# Patient Record
Sex: MALE | Race: WHITE | Employment: OTHER | URBAN - METROPOLITAN AREA
[De-identification: names, ages, dates, MRNs, and addresses within clinical notes are randomized per-mention and may not be internally consistent; named-entity substitution may affect disease eponyms.]

---

## 2018-03-15 ENCOUNTER — OFFICE VISIT (OUTPATIENT)
Dept: CARDIOLOGY CLINIC | Age: 78
End: 2018-03-15
Payer: MEDICARE

## 2018-03-15 VITALS
WEIGHT: 259.7 LBS | SYSTOLIC BLOOD PRESSURE: 118 MMHG | DIASTOLIC BLOOD PRESSURE: 80 MMHG | BODY MASS INDEX: 33.33 KG/M2 | HEIGHT: 74 IN | HEART RATE: 63 BPM | RESPIRATION RATE: 16 BRPM

## 2018-03-15 DIAGNOSIS — R94.39 ABNORMAL STRESS TEST: ICD-10-CM

## 2018-03-15 DIAGNOSIS — R94.31 ABNORMAL ECG: ICD-10-CM

## 2018-03-15 DIAGNOSIS — I10 HYPERTENSION, UNSPECIFIED TYPE: Primary | ICD-10-CM

## 2018-03-15 PROCEDURE — 93000 ELECTROCARDIOGRAM COMPLETE: CPT | Performed by: INTERNAL MEDICINE

## 2018-03-15 PROCEDURE — 99204 OFFICE O/P NEW MOD 45 MIN: CPT | Performed by: INTERNAL MEDICINE

## 2018-03-15 NOTE — PROGRESS NOTES
differently: Take 100 mg by mouth 2 times daily ) 60 capsule 0    Dextromethorphan-Guaifenesin (MUCINEX DM)  MG TB12 Take 1 tablet by mouth 2 times daily (Patient taking differently: Take 1 tablet by mouth 2 times daily ) 20 tablet 0    benzonatate (TESSALON) 200 MG capsule Take 1 capsule by mouth 3 times daily as needed for Cough (Patient taking differently: Take 200 mg by mouth 3 times daily as needed for Cough ) 30 capsule 0    ipratropium (ATROVENT) 0.06 % nasal spray 2 sprays by Nasal route 4 times daily for 4 days 1 Bottle 0     No current facility-administered medications for this visit.          ALLERGIES:  Allergies   Allergen Reactions    Other        MEDICAL HISTORY:  Past Medical History:   Diagnosis Date    BPH (benign prostatic hyperplasia)     Coronary artery disease     Diverticulitis of colon     Hyperlipidemia     Hypertension     Intestinal ulcer     PUD (peptic ulcer disease)     Sleep apnea     Possible       SURGICAL HISTORY:  Past Surgical History:   Procedure Laterality Date    CORONARY ANGIOPLASTY  7/13/09    DIAGNOSTIC CARDIAC CATH LAB PROCEDURE  7/13/09    NOSE SURGERY      SMALL INTESTINE SURGERY         FAMILY HISTORY:  Family History   Problem Relation Age of Onset    Heart Attack Father        SOCIAL HISTORY:  Social History     Social History    Marital status:      Spouse name: N/A    Number of children: N/A    Years of education: N/A     Social History Main Topics    Smoking status: Former Smoker     Packs/day: 1.00     Years: 40.00     Types: Cigarettes     Quit date: 3/15/2003    Smokeless tobacco: Never Used    Alcohol use 1.8 oz/week     3 Cans of beer per week      Comment: occasionally    Drug use: No    Sexual activity: Not Asked     Other Topics Concern    None     Social History Narrative    None       PHYSICAL EXAM:  Vitals:    03/15/18 1433   BP: 118/80   Pulse: 63   Resp: 16   Weight: 259 lb 11.2 oz (117.8 kg)   Height: 6' 2\" (1.88

## 2018-03-16 ENCOUNTER — TELEPHONE (OUTPATIENT)
Dept: CARDIOLOGY CLINIC | Age: 78
End: 2018-03-16

## 2018-03-20 ENCOUNTER — HOSPITAL ENCOUNTER (OUTPATIENT)
Dept: CARDIOLOGY | Age: 78
Discharge: HOME | End: 2018-03-20
Payer: MEDICARE

## 2018-03-20 DIAGNOSIS — R94.39 ABNORMAL STRESS TEST: ICD-10-CM

## 2018-03-20 DIAGNOSIS — R94.31 ABNORMAL ECG: ICD-10-CM

## 2018-03-20 LAB
LV EF: 65 %
LVEF MODALITY: NORMAL

## 2018-03-20 PROCEDURE — 93306 TTE W/DOPPLER COMPLETE: CPT

## 2018-03-21 ENCOUNTER — TELEPHONE (OUTPATIENT)
Dept: CARDIOLOGY CLINIC | Age: 78
End: 2018-03-21

## 2018-09-28 ENCOUNTER — OFFICE VISIT (OUTPATIENT)
Dept: CARDIOLOGY CLINIC | Age: 78
End: 2018-09-28
Payer: MEDICARE

## 2018-09-28 VITALS
BODY MASS INDEX: 32.74 KG/M2 | WEIGHT: 255.1 LBS | SYSTOLIC BLOOD PRESSURE: 142 MMHG | HEIGHT: 74 IN | HEART RATE: 67 BPM | RESPIRATION RATE: 18 BRPM | DIASTOLIC BLOOD PRESSURE: 86 MMHG

## 2018-09-28 DIAGNOSIS — I10 HYPERTENSION, UNSPECIFIED TYPE: Primary | ICD-10-CM

## 2018-09-28 DIAGNOSIS — I25.119 CORONARY ARTERY DISEASE WITH ANGINA PECTORIS, UNSPECIFIED VESSEL OR LESION TYPE, UNSPECIFIED WHETHER NATIVE OR TRANSPLANTED HEART (HCC): ICD-10-CM

## 2018-09-28 PROCEDURE — 99214 OFFICE O/P EST MOD 30 MIN: CPT | Performed by: INTERNAL MEDICINE

## 2018-09-28 PROCEDURE — 93000 ELECTROCARDIOGRAM COMPLETE: CPT | Performed by: INTERNAL MEDICINE

## 2018-09-28 RX ORDER — AMLODIPINE BESYLATE 5 MG/1
7.5 TABLET ORAL DAILY
Qty: 135 TABLET | Refills: 3 | Status: SHIPPED | OUTPATIENT
Start: 2018-09-28 | End: 2019-09-12

## 2018-09-28 RX ORDER — ATORVASTATIN CALCIUM 20 MG/1
TABLET, FILM COATED ORAL
Refills: 2 | COMMUNITY
Start: 2018-09-17

## 2019-09-04 ENCOUNTER — APPOINTMENT (OUTPATIENT)
Dept: CT IMAGING | Age: 79
DRG: 390 | End: 2019-09-04
Payer: MEDICARE

## 2019-09-04 ENCOUNTER — HOSPITAL ENCOUNTER (INPATIENT)
Age: 79
LOS: 2 days | Discharge: HOME | DRG: 390 | End: 2019-09-06
Attending: EMERGENCY MEDICINE | Admitting: FAMILY MEDICINE
Payer: MEDICARE

## 2019-09-04 ENCOUNTER — APPOINTMENT (OUTPATIENT)
Dept: GENERAL RADIOLOGY | Age: 79
DRG: 390 | End: 2019-09-04
Payer: MEDICARE

## 2019-09-04 DIAGNOSIS — K56.609 SMALL BOWEL OBSTRUCTION (HCC): Primary | ICD-10-CM

## 2019-09-04 LAB
ALBUMIN SERPL-MCNC: 4.6 G/DL (ref 3.5–5.2)
ALP BLD-CCNC: 76 U/L (ref 40–129)
ALT SERPL-CCNC: 21 U/L (ref 0–40)
ANION GAP SERPL CALCULATED.3IONS-SCNC: 13 MMOL/L (ref 7–16)
AST SERPL-CCNC: 21 U/L (ref 0–39)
BACTERIA: ABNORMAL /HPF
BASOPHILS ABSOLUTE: 0.02 E9/L (ref 0–0.2)
BASOPHILS RELATIVE PERCENT: 0.2 % (ref 0–2)
BILIRUB SERPL-MCNC: 2.5 MG/DL (ref 0–1.2)
BILIRUBIN URINE: NEGATIVE
BLOOD, URINE: NEGATIVE
BUN BLDV-MCNC: 13 MG/DL (ref 8–23)
CALCIUM SERPL-MCNC: 9.4 MG/DL (ref 8.6–10.2)
CHLORIDE BLD-SCNC: 102 MMOL/L (ref 98–107)
CLARITY: CLEAR
CO2: 25 MMOL/L (ref 22–29)
COLOR: YELLOW
CREAT SERPL-MCNC: 0.9 MG/DL (ref 0.7–1.2)
EOSINOPHILS ABSOLUTE: 0 E9/L (ref 0.05–0.5)
EOSINOPHILS RELATIVE PERCENT: 0 % (ref 0–6)
GFR AFRICAN AMERICAN: >60
GFR NON-AFRICAN AMERICAN: >60 ML/MIN/1.73
GLUCOSE BLD-MCNC: 164 MG/DL (ref 74–99)
GLUCOSE URINE: NEGATIVE MG/DL
HCT VFR BLD CALC: 45.4 % (ref 37–54)
HEMOGLOBIN: 15.7 G/DL (ref 12.5–16.5)
IMMATURE GRANULOCYTES #: 0.03 E9/L
IMMATURE GRANULOCYTES %: 0.3 % (ref 0–5)
KETONES, URINE: 15 MG/DL
LACTIC ACID, SEPSIS: 1.4 MMOL/L (ref 0.5–1.9)
LEUKOCYTE ESTERASE, URINE: NEGATIVE
LIPASE: 32 U/L (ref 13–60)
LYMPHOCYTES ABSOLUTE: 0.68 E9/L (ref 1.5–4)
LYMPHOCYTES RELATIVE PERCENT: 6 % (ref 20–42)
MCH RBC QN AUTO: 30.1 PG (ref 26–35)
MCHC RBC AUTO-ENTMCNC: 34.6 % (ref 32–34.5)
MCV RBC AUTO: 87.1 FL (ref 80–99.9)
MONOCYTES ABSOLUTE: 0.35 E9/L (ref 0.1–0.95)
MONOCYTES RELATIVE PERCENT: 3.1 % (ref 2–12)
NEUTROPHILS ABSOLUTE: 10.31 E9/L (ref 1.8–7.3)
NEUTROPHILS RELATIVE PERCENT: 90.4 % (ref 43–80)
NITRITE, URINE: NEGATIVE
PDW BLD-RTO: 13.7 FL (ref 11.5–15)
PH UA: 5.5 (ref 5–9)
PLATELET # BLD: 202 E9/L (ref 130–450)
PMV BLD AUTO: 8.9 FL (ref 7–12)
POTASSIUM SERPL-SCNC: 4.2 MMOL/L (ref 3.5–5)
PROTEIN UA: 100 MG/DL
RBC # BLD: 5.21 E12/L (ref 3.8–5.8)
RBC UA: ABNORMAL /HPF (ref 0–2)
SODIUM BLD-SCNC: 140 MMOL/L (ref 132–146)
SPECIFIC GRAVITY UA: 1.02 (ref 1–1.03)
TOTAL PROTEIN: 7.5 G/DL (ref 6.4–8.3)
TROPONIN: <0.01 NG/ML (ref 0–0.03)
UROBILINOGEN, URINE: 0.2 E.U./DL
WBC # BLD: 11.4 E9/L (ref 4.5–11.5)
WBC UA: ABNORMAL /HPF (ref 0–5)

## 2019-09-04 PROCEDURE — 80053 COMPREHEN METABOLIC PANEL: CPT

## 2019-09-04 PROCEDURE — 83690 ASSAY OF LIPASE: CPT

## 2019-09-04 PROCEDURE — C9113 INJ PANTOPRAZOLE SODIUM, VIA: HCPCS | Performed by: STUDENT IN AN ORGANIZED HEALTH CARE EDUCATION/TRAINING PROGRAM

## 2019-09-04 PROCEDURE — 6360000002 HC RX W HCPCS: Performed by: FAMILY MEDICINE

## 2019-09-04 PROCEDURE — 85025 COMPLETE CBC W/AUTO DIFF WBC: CPT

## 2019-09-04 PROCEDURE — 99285 EMERGENCY DEPT VISIT HI MDM: CPT

## 2019-09-04 PROCEDURE — 74177 CT ABD & PELVIS W/CONTRAST: CPT

## 2019-09-04 PROCEDURE — 1200000000 HC SEMI PRIVATE

## 2019-09-04 PROCEDURE — 96374 THER/PROPH/DIAG INJ IV PUSH: CPT

## 2019-09-04 PROCEDURE — 81001 URINALYSIS AUTO W/SCOPE: CPT

## 2019-09-04 PROCEDURE — 83605 ASSAY OF LACTIC ACID: CPT

## 2019-09-04 PROCEDURE — 2580000003 HC RX 258: Performed by: FAMILY MEDICINE

## 2019-09-04 PROCEDURE — 93005 ELECTROCARDIOGRAM TRACING: CPT | Performed by: STUDENT IN AN ORGANIZED HEALTH CARE EDUCATION/TRAINING PROGRAM

## 2019-09-04 PROCEDURE — 6360000004 HC RX CONTRAST MEDICATION: Performed by: RADIOLOGY

## 2019-09-04 PROCEDURE — 84484 ASSAY OF TROPONIN QUANT: CPT

## 2019-09-04 PROCEDURE — 2500000003 HC RX 250 WO HCPCS: Performed by: FAMILY MEDICINE

## 2019-09-04 PROCEDURE — 6360000002 HC RX W HCPCS: Performed by: STUDENT IN AN ORGANIZED HEALTH CARE EDUCATION/TRAINING PROGRAM

## 2019-09-04 PROCEDURE — 96375 TX/PRO/DX INJ NEW DRUG ADDON: CPT

## 2019-09-04 PROCEDURE — 74018 RADEX ABDOMEN 1 VIEW: CPT

## 2019-09-04 PROCEDURE — 2580000003 HC RX 258: Performed by: RADIOLOGY

## 2019-09-04 PROCEDURE — 71046 X-RAY EXAM CHEST 2 VIEWS: CPT

## 2019-09-04 PROCEDURE — 2580000003 HC RX 258: Performed by: STUDENT IN AN ORGANIZED HEALTH CARE EDUCATION/TRAINING PROGRAM

## 2019-09-04 RX ORDER — MORPHINE SULFATE 4 MG/ML
4 INJECTION, SOLUTION INTRAMUSCULAR; INTRAVENOUS
Status: DISCONTINUED | OUTPATIENT
Start: 2019-09-04 | End: 2019-09-06 | Stop reason: HOSPADM

## 2019-09-04 RX ORDER — METOPROLOL TARTRATE 5 MG/5ML
2.5 INJECTION INTRAVENOUS EVERY 6 HOURS
Status: DISCONTINUED | OUTPATIENT
Start: 2019-09-04 | End: 2019-09-06

## 2019-09-04 RX ORDER — PANTOPRAZOLE SODIUM 40 MG/10ML
40 INJECTION, POWDER, LYOPHILIZED, FOR SOLUTION INTRAVENOUS ONCE
Status: COMPLETED | OUTPATIENT
Start: 2019-09-04 | End: 2019-09-04

## 2019-09-04 RX ORDER — 0.9 % SODIUM CHLORIDE 0.9 %
1000 INTRAVENOUS SOLUTION INTRAVENOUS ONCE
Status: COMPLETED | OUTPATIENT
Start: 2019-09-04 | End: 2019-09-04

## 2019-09-04 RX ORDER — MORPHINE SULFATE 2 MG/ML
2 INJECTION, SOLUTION INTRAMUSCULAR; INTRAVENOUS
Status: DISCONTINUED | OUTPATIENT
Start: 2019-09-04 | End: 2019-09-06 | Stop reason: HOSPADM

## 2019-09-04 RX ORDER — MORPHINE SULFATE 8 MG/ML
8 INJECTION, SOLUTION INTRAMUSCULAR; INTRAVENOUS ONCE
Status: COMPLETED | OUTPATIENT
Start: 2019-09-04 | End: 2019-09-04

## 2019-09-04 RX ORDER — ONDANSETRON 2 MG/ML
4 INJECTION INTRAMUSCULAR; INTRAVENOUS EVERY 8 HOURS PRN
Status: DISCONTINUED | OUTPATIENT
Start: 2019-09-04 | End: 2019-09-06 | Stop reason: HOSPADM

## 2019-09-04 RX ORDER — SODIUM CHLORIDE 0.9 % (FLUSH) 0.9 %
10 SYRINGE (ML) INJECTION PRN
Status: DISCONTINUED | OUTPATIENT
Start: 2019-09-04 | End: 2019-09-06 | Stop reason: HOSPADM

## 2019-09-04 RX ORDER — SODIUM CHLORIDE 9 MG/ML
INJECTION, SOLUTION INTRAVENOUS CONTINUOUS
Status: DISCONTINUED | OUTPATIENT
Start: 2019-09-04 | End: 2019-09-05

## 2019-09-04 RX ORDER — ONDANSETRON 2 MG/ML
4 INJECTION INTRAMUSCULAR; INTRAVENOUS ONCE
Status: COMPLETED | OUTPATIENT
Start: 2019-09-04 | End: 2019-09-04

## 2019-09-04 RX ORDER — SODIUM CHLORIDE 9 MG/ML
INJECTION, SOLUTION INTRAVENOUS CONTINUOUS
Status: DISCONTINUED | OUTPATIENT
Start: 2019-09-04 | End: 2019-09-06

## 2019-09-04 RX ORDER — SODIUM CHLORIDE 0.9 % (FLUSH) 0.9 %
10 SYRINGE (ML) INJECTION EVERY 12 HOURS SCHEDULED
Status: DISCONTINUED | OUTPATIENT
Start: 2019-09-04 | End: 2019-09-06 | Stop reason: HOSPADM

## 2019-09-04 RX ORDER — ACETAMINOPHEN 325 MG/1
650 TABLET ORAL EVERY 4 HOURS PRN
Status: DISCONTINUED | OUTPATIENT
Start: 2019-09-04 | End: 2019-09-06 | Stop reason: HOSPADM

## 2019-09-04 RX ADMIN — ONDANSETRON 4 MG: 2 INJECTION INTRAMUSCULAR; INTRAVENOUS at 11:13

## 2019-09-04 RX ADMIN — SODIUM CHLORIDE 1000 ML: 9 INJECTION, SOLUTION INTRAVENOUS at 11:13

## 2019-09-04 RX ADMIN — HYDROMORPHONE HYDROCHLORIDE 0.5 MG: 1 INJECTION, SOLUTION INTRAMUSCULAR; INTRAVENOUS; SUBCUTANEOUS at 13:50

## 2019-09-04 RX ADMIN — Medication 10 ML: at 12:12

## 2019-09-04 RX ADMIN — ONDANSETRON 4 MG: 2 INJECTION INTRAMUSCULAR; INTRAVENOUS at 18:48

## 2019-09-04 RX ADMIN — MORPHINE SULFATE 2 MG: 2 INJECTION, SOLUTION INTRAMUSCULAR; INTRAVENOUS at 18:48

## 2019-09-04 RX ADMIN — MORPHINE SULFATE 8 MG: 8 INJECTION INTRAVENOUS at 11:13

## 2019-09-04 RX ADMIN — IOPAMIDOL 110 ML: 755 INJECTION, SOLUTION INTRAVENOUS at 12:12

## 2019-09-04 RX ADMIN — IOHEXOL 50 ML: 240 INJECTION, SOLUTION INTRATHECAL; INTRAVASCULAR; INTRAVENOUS; ORAL at 12:12

## 2019-09-04 RX ADMIN — PANTOPRAZOLE SODIUM 40 MG: 40 INJECTION, POWDER, LYOPHILIZED, FOR SOLUTION INTRAVENOUS at 12:04

## 2019-09-04 RX ADMIN — SODIUM CHLORIDE: 9 INJECTION, SOLUTION INTRAVENOUS at 17:07

## 2019-09-04 RX ADMIN — METOPROLOL TARTRATE 2.5 MG: 5 INJECTION, SOLUTION INTRAVENOUS at 23:04

## 2019-09-04 RX ADMIN — SODIUM CHLORIDE: 9 INJECTION, SOLUTION INTRAVENOUS at 22:56

## 2019-09-04 ASSESSMENT — ENCOUNTER SYMPTOMS
VOMITING: 1
ABDOMINAL DISTENTION: 0
CONSTIPATION: 1
ABDOMINAL PAIN: 1
COLOR CHANGE: 0
BLOOD IN STOOL: 0
TROUBLE SWALLOWING: 0
SHORTNESS OF BREATH: 0
NAUSEA: 1
DIARRHEA: 0
SORE THROAT: 0
VOICE CHANGE: 0
COUGH: 0

## 2019-09-04 ASSESSMENT — PAIN DESCRIPTION - PAIN TYPE: TYPE: ACUTE PAIN

## 2019-09-04 ASSESSMENT — PAIN SCALES - GENERAL
PAINLEVEL_OUTOF10: 10
PAINLEVEL_OUTOF10: 5

## 2019-09-04 ASSESSMENT — PAIN DESCRIPTION - DESCRIPTORS: DESCRIPTORS: ACHING;THROBBING

## 2019-09-04 ASSESSMENT — PAIN DESCRIPTION - FREQUENCY: FREQUENCY: CONTINUOUS

## 2019-09-04 ASSESSMENT — PAIN DESCRIPTION - LOCATION
LOCATION: ABDOMEN
LOCATION: ABDOMEN

## 2019-09-04 NOTE — ED PROVIDER NOTES
labs, cardiac work-up ,and perform CT scan abdomen pelvis. Also treating patient with IV fluids, morphine, and Zofran. Will reassess symptoms shortly. [LS]   1038 Patient going to CT scan at this time.    [LS]   1117 EKG: This EKG is signed and interpreted by me. Rate: 81  Rhythm: Sinus  Interpretation: Sinus rhythm, first-degree AV block, RBBB, bifascicular block, minimal voltage criteria for LVH  Comparison: Stable compared to last EKG on 9-28-18      [LS]   1118 Patient is now received his medications. Labs pending.    [LS]   1141 Lactic Acid, Sepsis: 1.4 [LS]   3128 WBC: 11.4 [LS]   1141 Neutrophils %(!): 90.4 [LS]   1149 Troponin: <0.01 [LS]   1150 Lipase: 32 [LS]   1200 Patient reassessed. His abdominal pain is down to a 1/10 in severity and he reports he is comfortable. He has no nausea at this time. His abdomen is soft and minimally tender at this time. 40 mg of IV Protonix has been ordered. Patient just finished his oral contrast and will be going to CT scan soon. Labs essentially unremarkable with the exception of a bilirubin of 2.5. Patient has no jaundice or icterus. Urinalysis is also pending at this time.    [LS]   1328 Patient has a high-grade small bowel obstruction on CT scan. Patient updated on the plan for admission. We have a call out to general surgery. Patient is agreeable to staying. He is still having significant pain so we will give him some Dilaudid. NG tube is also been placed and he is now n.p.o.    [LS]   1358 Case discussed with Dr. Carolyne Adams. He would like the patient to be evaluated by the surgical resident.    [LS]   [phone] Case discussed with Dr. Neelima Garcia. He agrees to accept the patient for admission.    [LS]      ED Course User Index  [LS] Karla Granados,         ED Course as of Sep 04 1619   Wed Sep 04, 2019   1024 Case discussed with the attending physician. We will check labs, cardiac work-up ,and perform CT scan abdomen pelvis.   Also treating patient with IV fluids, morphine, and Zofran. Will reassess symptoms shortly. [LS]   1038 Patient going to CT scan at this time.    [LS]   1117 EKG: This EKG is signed and interpreted by me. Rate: 81  Rhythm: Sinus  Interpretation: Sinus rhythm, first-degree AV block, RBBB, bifascicular block, minimal voltage criteria for LVH  Comparison: Stable compared to last EKG on 9-28-18      [LS]   1118 Patient is now received his medications. Labs pending.    [LS]   1141 Lactic Acid, Sepsis: 1.4 [LS]   8681 WBC: 11.4 [LS]   1141 Neutrophils %(!): 90.4 [LS]   1149 Troponin: <0.01 [LS]   1150 Lipase: 32 [LS]   1200 Patient reassessed. His abdominal pain is down to a 1/10 in severity and he reports he is comfortable. He has no nausea at this time. His abdomen is soft and minimally tender at this time. 40 mg of IV Protonix has been ordered. Patient just finished his oral contrast and will be going to CT scan soon. Labs essentially unremarkable with the exception of a bilirubin of 2.5. Patient has no jaundice or icterus. Urinalysis is also pending at this time.    [LS]   1328 Patient has a high-grade small bowel obstruction on CT scan. Patient updated on the plan for admission. We have a call out to general surgery. Patient is agreeable to staying. He is still having significant pain so we will give him some Dilaudid. NG tube is also been placed and he is now n.p.o.    [LS]   1358 Case discussed with Dr. Verner Laine. He would like the patient to be evaluated by the surgical resident.    [LS]   80 Case discussed with Dr. Héctor Velasquez.   He agrees to accept the patient for admission.    [LS]      ED Course User Index  [LS] Yamila Rosario, DO       --------------------------------------------- PAST HISTORY ---------------------------------------------  Past Medical History:  has a past medical history of BPH (benign prostatic hyperplasia), Coronary artery disease, Diverticulitis of colon, Hyperlipidemia, Hypertension, Intestinal ulcer, PUD (peptic ulcer disease), and Sleep apnea. Past Surgical History:  has a past surgical history that includes Diagnostic Cardiac Cath Lab Procedure (7/13/09); Coronary angioplasty (7/13/09); Nose surgery; and Small intestine surgery. Social History:  reports that he quit smoking about 16 years ago. His smoking use included cigarettes. He has a 40.00 pack-year smoking history. He has never used smokeless tobacco. He reports that he drinks alcohol. He reports that he does not use drugs. Family History: family history includes Heart Attack in his father. The patients home medications have been reviewed. Allergies:  Other    -------------------------------------------------- RESULTS -------------------------------------------------    LABS:  Results for orders placed or performed during the hospital encounter of 09/04/19   CBC Auto Differential   Result Value Ref Range    WBC 11.4 4.5 - 11.5 E9/L    RBC 5.21 3.80 - 5.80 E12/L    Hemoglobin 15.7 12.5 - 16.5 g/dL    Hematocrit 45.4 37.0 - 54.0 %    MCV 87.1 80.0 - 99.9 fL    MCH 30.1 26.0 - 35.0 pg    MCHC 34.6 (H) 32.0 - 34.5 %    RDW 13.7 11.5 - 15.0 fL    Platelets 505 825 - 872 E9/L    MPV 8.9 7.0 - 12.0 fL    Neutrophils % 90.4 (H) 43.0 - 80.0 %    Immature Granulocytes % 0.3 0.0 - 5.0 %    Lymphocytes % 6.0 (L) 20.0 - 42.0 %    Monocytes % 3.1 2.0 - 12.0 %    Eosinophils % 0.0 0.0 - 6.0 %    Basophils % 0.2 0.0 - 2.0 %    Neutrophils Absolute 10.31 (H) 1.80 - 7.30 E9/L    Immature Granulocytes # 0.03 E9/L    Lymphocytes Absolute 0.68 (L) 1.50 - 4.00 E9/L    Monocytes Absolute 0.35 0.10 - 0.95 E9/L    Eosinophils Absolute 0.00 (L) 0.05 - 0.50 E9/L    Basophils Absolute 0.02 0.00 - 0.20 E9/L   Comprehensive Metabolic Panel   Result Value Ref Range    Sodium 140 132 - 146 mmol/L    Potassium 4.2 3.5 - 5.0 mmol/L    Chloride 102 98 - 107 mmol/L    CO2 25 22 - 29 mmol/L    Anion Gap 13 7 - 16 mmol/L    Glucose 164 (H) 74 - 99 mg/dL    BUN 13 8 -

## 2019-09-05 ENCOUNTER — APPOINTMENT (OUTPATIENT)
Dept: GENERAL RADIOLOGY | Age: 79
DRG: 390 | End: 2019-09-05
Payer: MEDICARE

## 2019-09-05 LAB
ANION GAP SERPL CALCULATED.3IONS-SCNC: 10 MMOL/L (ref 7–16)
BUN BLDV-MCNC: 11 MG/DL (ref 8–23)
CALCIUM SERPL-MCNC: 8.5 MG/DL (ref 8.6–10.2)
CHLORIDE BLD-SCNC: 107 MMOL/L (ref 98–107)
CO2: 25 MMOL/L (ref 22–29)
CREAT SERPL-MCNC: 1 MG/DL (ref 0.7–1.2)
EKG ATRIAL RATE: 81 BPM
EKG P AXIS: 5 DEGREES
EKG P-R INTERVAL: 232 MS
EKG Q-T INTERVAL: 458 MS
EKG QRS DURATION: 154 MS
EKG QTC CALCULATION (BAZETT): 532 MS
EKG R AXIS: -78 DEGREES
EKG T AXIS: 11 DEGREES
EKG VENTRICULAR RATE: 81 BPM
GFR AFRICAN AMERICAN: >60
GFR NON-AFRICAN AMERICAN: >60 ML/MIN/1.73
GLUCOSE BLD-MCNC: 130 MG/DL (ref 74–99)
HCT VFR BLD CALC: 40.6 % (ref 37–54)
HEMOGLOBIN: 14 G/DL (ref 12.5–16.5)
MCH RBC QN AUTO: 30.8 PG (ref 26–35)
MCHC RBC AUTO-ENTMCNC: 34.5 % (ref 32–34.5)
MCV RBC AUTO: 89.4 FL (ref 80–99.9)
PDW BLD-RTO: 14.1 FL (ref 11.5–15)
PLATELET # BLD: 180 E9/L (ref 130–450)
PMV BLD AUTO: 8.7 FL (ref 7–12)
POTASSIUM SERPL-SCNC: 3.9 MMOL/L (ref 3.5–5)
RBC # BLD: 4.54 E12/L (ref 3.8–5.8)
SODIUM BLD-SCNC: 142 MMOL/L (ref 132–146)
WBC # BLD: 8.2 E9/L (ref 4.5–11.5)

## 2019-09-05 PROCEDURE — 1200000000 HC SEMI PRIVATE

## 2019-09-05 PROCEDURE — 85027 COMPLETE CBC AUTOMATED: CPT

## 2019-09-05 PROCEDURE — 74018 RADEX ABDOMEN 1 VIEW: CPT

## 2019-09-05 PROCEDURE — 6360000002 HC RX W HCPCS: Performed by: FAMILY MEDICINE

## 2019-09-05 PROCEDURE — 80048 BASIC METABOLIC PNL TOTAL CA: CPT

## 2019-09-05 PROCEDURE — 2500000003 HC RX 250 WO HCPCS: Performed by: FAMILY MEDICINE

## 2019-09-05 PROCEDURE — 93010 ELECTROCARDIOGRAM REPORT: CPT | Performed by: INTERNAL MEDICINE

## 2019-09-05 PROCEDURE — 36415 COLL VENOUS BLD VENIPUNCTURE: CPT

## 2019-09-05 RX ADMIN — METOPROLOL TARTRATE 2.5 MG: 5 INJECTION, SOLUTION INTRAVENOUS at 11:32

## 2019-09-05 RX ADMIN — METOPROLOL TARTRATE 2.5 MG: 5 INJECTION, SOLUTION INTRAVENOUS at 05:06

## 2019-09-05 RX ADMIN — MORPHINE SULFATE 2 MG: 2 INJECTION, SOLUTION INTRAMUSCULAR; INTRAVENOUS at 20:17

## 2019-09-05 RX ADMIN — METOPROLOL TARTRATE 2.5 MG: 5 INJECTION, SOLUTION INTRAVENOUS at 18:45

## 2019-09-05 RX ADMIN — MORPHINE SULFATE 2 MG: 2 INJECTION, SOLUTION INTRAMUSCULAR; INTRAVENOUS at 11:32

## 2019-09-05 RX ADMIN — MORPHINE SULFATE 2 MG: 2 INJECTION, SOLUTION INTRAMUSCULAR; INTRAVENOUS at 00:07

## 2019-09-05 ASSESSMENT — PAIN DESCRIPTION - ORIENTATION
ORIENTATION: MID;LOWER

## 2019-09-05 ASSESSMENT — PAIN DESCRIPTION - PAIN TYPE
TYPE: ACUTE PAIN

## 2019-09-05 ASSESSMENT — PAIN DESCRIPTION - LOCATION
LOCATION: ABDOMEN

## 2019-09-05 ASSESSMENT — PAIN DESCRIPTION - DESCRIPTORS
DESCRIPTORS: ACHING;DISCOMFORT

## 2019-09-05 ASSESSMENT — PAIN DESCRIPTION - PROGRESSION
CLINICAL_PROGRESSION: GRADUALLY WORSENING
CLINICAL_PROGRESSION: NOT CHANGED
CLINICAL_PROGRESSION: GRADUALLY WORSENING

## 2019-09-05 ASSESSMENT — PAIN DESCRIPTION - ONSET
ONSET: ON-GOING

## 2019-09-05 ASSESSMENT — PAIN DESCRIPTION - FREQUENCY
FREQUENCY: CONTINUOUS

## 2019-09-05 ASSESSMENT — PAIN SCALES - GENERAL
PAINLEVEL_OUTOF10: 4
PAINLEVEL_OUTOF10: 5
PAINLEVEL_OUTOF10: 0
PAINLEVEL_OUTOF10: 5
PAINLEVEL_OUTOF10: 2
PAINLEVEL_OUTOF10: 0

## 2019-09-05 ASSESSMENT — PAIN - FUNCTIONAL ASSESSMENT
PAIN_FUNCTIONAL_ASSESSMENT: PREVENTS OR INTERFERES SOME ACTIVE ACTIVITIES AND ADLS

## 2019-09-05 NOTE — H&P
Clear with equal breath sounds. CARDIOVASCULAR:  Regular. No murmur, rub, or gallop. CAROTIDS:  No bruits. EXTREMITIES:  Pulses intact. No edema. AXILLAE:  No adenopathy. ABDOMEN:  Severely distended, tympanitic. There is some tenderness in  the left lower quadrant. No masses. No hepatosplenomegaly. No  rebound. Bowel sounds are present. LABORATORY STUDIES:  Admitting laboratory data is as follows: White  count 11,400, hemoglobin 15.7, and platelet count 391,685. Liver  enzymes were normal except bilirubin slightly high at 3.5. BUN 13,  creatinine 0.9, and potassium 4.2. Lactic acid level normal at 1.4. Urinalysis 2 to 5 white cells and no red cells. DIAGNOSTIC STUDIES:  A CT scan of the abdomen and pelvis was performed,  which revealed a high-grade small bowel obstruction with dilated  fluid-filled proximal small bowel loops and collapsed distal small bowel  loops consistent with a proximal small bowel obstruction. He also  underwent chest x-ray, which was unremarkable. ASSESSMENT:  Small bowel obstruction, ASCVD, hypertension, sleep apnea,  BPH, and hyperlipidemia. PLAN:  Surgical consultation. NG tube with intermittent suction. IV  fluids. Monitor I and O.         Rickie Morataya MD    D: 09/04/2019 18:33:41       T: 09/04/2019 18:40:14     [email or web address]  Job#: [id number]     Doc#: [id number]    CC:

## 2019-09-06 VITALS
HEART RATE: 74 BPM | HEIGHT: 74 IN | DIASTOLIC BLOOD PRESSURE: 73 MMHG | OXYGEN SATURATION: 94 % | SYSTOLIC BLOOD PRESSURE: 147 MMHG | WEIGHT: 258 LBS | RESPIRATION RATE: 20 BRPM | TEMPERATURE: 98.5 F | BODY MASS INDEX: 33.11 KG/M2

## 2019-09-06 LAB
ANION GAP SERPL CALCULATED.3IONS-SCNC: 12 MMOL/L (ref 7–16)
BUN BLDV-MCNC: 8 MG/DL (ref 8–23)
CALCIUM SERPL-MCNC: 8.2 MG/DL (ref 8.6–10.2)
CHLORIDE BLD-SCNC: 102 MMOL/L (ref 98–107)
CO2: 22 MMOL/L (ref 22–29)
CREAT SERPL-MCNC: 0.9 MG/DL (ref 0.7–1.2)
GFR AFRICAN AMERICAN: >60
GFR NON-AFRICAN AMERICAN: >60 ML/MIN/1.73
GLUCOSE BLD-MCNC: 119 MG/DL (ref 74–99)
HCT VFR BLD CALC: 39 % (ref 37–54)
HEMOGLOBIN: 13.6 G/DL (ref 12.5–16.5)
MCH RBC QN AUTO: 31.1 PG (ref 26–35)
MCHC RBC AUTO-ENTMCNC: 34.9 % (ref 32–34.5)
MCV RBC AUTO: 89 FL (ref 80–99.9)
PDW BLD-RTO: 14 FL (ref 11.5–15)
PLATELET # BLD: 153 E9/L (ref 130–450)
PMV BLD AUTO: 8.5 FL (ref 7–12)
POTASSIUM SERPL-SCNC: 3.5 MMOL/L (ref 3.5–5)
RBC # BLD: 4.38 E12/L (ref 3.8–5.8)
SODIUM BLD-SCNC: 136 MMOL/L (ref 132–146)
WBC # BLD: 7.8 E9/L (ref 4.5–11.5)

## 2019-09-06 PROCEDURE — 36415 COLL VENOUS BLD VENIPUNCTURE: CPT

## 2019-09-06 PROCEDURE — 2580000003 HC RX 258: Performed by: FAMILY MEDICINE

## 2019-09-06 PROCEDURE — 2500000003 HC RX 250 WO HCPCS: Performed by: FAMILY MEDICINE

## 2019-09-06 PROCEDURE — 80048 BASIC METABOLIC PNL TOTAL CA: CPT

## 2019-09-06 PROCEDURE — 6370000000 HC RX 637 (ALT 250 FOR IP): Performed by: FAMILY MEDICINE

## 2019-09-06 PROCEDURE — 85027 COMPLETE CBC AUTOMATED: CPT

## 2019-09-06 RX ORDER — AMLODIPINE BESYLATE 5 MG/1
5 TABLET ORAL DAILY
Status: DISCONTINUED | OUTPATIENT
Start: 2019-09-06 | End: 2019-09-06 | Stop reason: HOSPADM

## 2019-09-06 RX ORDER — ATORVASTATIN CALCIUM 20 MG/1
20 TABLET, FILM COATED ORAL NIGHTLY
Status: DISCONTINUED | OUTPATIENT
Start: 2019-09-06 | End: 2019-09-06 | Stop reason: HOSPADM

## 2019-09-06 RX ADMIN — METOPROLOL TARTRATE 25 MG: 25 TABLET ORAL at 10:31

## 2019-09-06 RX ADMIN — AMLODIPINE BESYLATE 5 MG: 5 TABLET ORAL at 10:31

## 2019-09-06 RX ADMIN — Medication 10 ML: at 10:32

## 2019-09-06 RX ADMIN — METOPROLOL TARTRATE 2.5 MG: 5 INJECTION, SOLUTION INTRAVENOUS at 02:28

## 2019-09-06 ASSESSMENT — PAIN DESCRIPTION - DESCRIPTORS: DESCRIPTORS: SORE

## 2019-09-06 ASSESSMENT — PAIN DESCRIPTION - LOCATION: LOCATION: ABDOMEN

## 2019-09-06 ASSESSMENT — PAIN SCALES - GENERAL
PAINLEVEL_OUTOF10: 4
PAINLEVEL_OUTOF10: 0

## 2019-09-06 ASSESSMENT — PAIN DESCRIPTION - ORIENTATION: ORIENTATION: MID;LOWER

## 2019-09-06 ASSESSMENT — PAIN DESCRIPTION - PAIN TYPE: TYPE: ACUTE PAIN

## 2019-09-06 NOTE — PROGRESS NOTES
Called Dr. Maximino Pierce per RN DJ  Reason wife here patient would like to be discharged.   Zora Landrum  9/6/2019  12:00 PM

## 2019-09-06 NOTE — PROGRESS NOTES
[facility] Quality Flow/Interdisciplinary Rounds Progress Note        Quality Flow Rounds held on September 6, 2019    Disciplines Attending:  Bedside Nurse, ,  and Nursing Unit Leadership    Joy Herrera was admitted on 9/4/2019  9:45 AM    Anticipated Discharge Date:  Expected Discharge Date: 09/07/19    Disposition:    Sorin Score:  Sorin Scale Score: 23    Readmission Risk              Risk of Unplanned Readmission:        9           Discussed patient goal for the day, patient clinical progression, and barriers to discharge.   The following Goal(s) of the Day/Commitment(s) have been identified:  Discharge [address] [place]  September 6, 2019

## 2019-09-06 NOTE — PROGRESS NOTES
Subjective: The patient is awake and alert. No problems overnight. Denies chest pain, angina, and dyspnea. Denies abdominal pain. Tolerating clear liquid diet  . No nausea or vomiting.had another small BM,passing flatus    Objective:    Vitals:  BP (!) 174/79   Pulse 89   Temp 98.1 °F (36.7 °C)   Resp 16   Ht 6' 2\" (1.88 m)   Wt 258 lb (117 kg)   SpO2 98%   BMI 33.13 kg/m²     Physical Exam:  Heart:  RRR, no murmurs, gallops, or rubs. Lungs:  CTA bilaterally, no wheeze, rales or rhonchi  Abd: bowel sounds present, nontender,  Extrem:  No clubbing, cyanosis, or edema    Labs:  CBC:   Lab Results   Component Value Date    WBC 8.2 2019    RBC 4.54 2019    HGB 14.0 2019    HCT 40.6 2019    MCV 89.4 2019    MCH 30.8 2019    MCHC 34.5 2019    RDW 14.1 2019     2019    MPV 8.7 2019     BMP:    Lab Results   Component Value Date     2019    K 3.9 2019     2019    CO2 25 2019    BUN 11 2019    LABALBU 4.6 2019    LABALBU 4.2 2011    CREATININE 1.0 2019    CALCIUM 8.5 2019    GFRAA >60 2019    LABGLOM >60 2019    GLUCOSE 130 2019    GLUCOSE 147 2011        Radiology:  Xr Chest Standard (2 Vw)    Result Date: 2019  Patient MRN:  [id number] : 1940 Age: 66 years Gender: Male Order Date:  2019 10:30 AM EXAM: XR CHEST (2 VW) INDICATION:  chest pain chest pain COMPARISON: 10/28/2017 FINDINGS:  Heart, mediastinum and pulmonary vascularity are normal. There are no infiltrates or effusions.  There is minimal scarring in the left costophrenic angle region     No acute process     Xr Abdomen (kub) (single Ap View)    Result Date: 2019  Patient MRN:  [id number] : 1940 Age: 66 years Gender: Male Order Date:  2019 6:15 AM EXAM: XR ABDOMEN (KUB) (SINGLE AP VIEW) NUMBER OF IMAGES:  3 views INDICATION:  SBO SBO COMPARISON: 2019 FINDINGS: This study is centered on the diaphragm. The study is performed for evaluation of the position of the NG tube. There is incomplete evaluation of the chest. There is incomplete evaluation of the abdomen. The visualized portions of the abdomen reveal the bowel gas pattern is nonspecific. An NG tube is noted. The tip of the tube is at the expected level of the gastric cardia. Ct Abdomen Pelvis W Iv Contrast Additional Contrast? Oral    Result Date: 2019  Patient MRN:  [id number] : 1940 Age: 66 years Gender: Male Order Date:  2019 10:30 AM EXAM: CT ABDOMEN PELVIS W IV CONTRAST number of images 447. Contrast. Isovue-370, 110 mL intravenously. Omnipaque 240, 50 mL oral. Technique: Low-dose CT  acquisition technique included one of following options; 1 . Automated exposure control, 2. Adjustment of MA and or KV according to patient's size or 3. Use of iterative reconstruction. INDICATION:  ABDOMINAL PAIN  COMPARISON: 2017 FINDINGS: The lung bases demonstrate minimal scarring and atelectasis. There is coronary artery calcification. There is hepatomegaly with liver measuring 19 cm which is fatty infiltrated. Multiple gallstones are identified in the gallbladder without acute inflammation. Spleen, pancreas, and adrenals are within normal limits. Multiple renal cyst are identified in the kidneys with the largest one in the right kidney measuring 5.5 cm in the largest one in the left kidney measuring 6.5 cm. 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 There is degenerative changes in lumbar spine with bilateral pars defect at L5 with  mild grade 1 spondylolisthesis at L5-S1. There is moderately dilated fluid-filled proximal small bowel loops measuring up to 4 cm with a collapsed distal small bowel loops and colon. Pelvis. Bladder is partially distended. There is diverticulosis of colon without diverticulitis.  The appendix is normal.     High-grade

## 2019-09-12 RX ORDER — AMLODIPINE BESYLATE 5 MG/1
TABLET ORAL
Qty: 135 TABLET | Refills: 3 | Status: SHIPPED
Start: 2019-09-12 | End: 2021-06-15

## 2019-09-30 ENCOUNTER — OFFICE VISIT (OUTPATIENT)
Dept: CARDIOLOGY CLINIC | Age: 79
End: 2019-09-30
Payer: MEDICARE

## 2019-09-30 VITALS
HEIGHT: 74 IN | RESPIRATION RATE: 16 BRPM | HEART RATE: 77 BPM | SYSTOLIC BLOOD PRESSURE: 124 MMHG | WEIGHT: 256.3 LBS | DIASTOLIC BLOOD PRESSURE: 64 MMHG | BODY MASS INDEX: 32.89 KG/M2

## 2019-09-30 DIAGNOSIS — I25.119 CORONARY ARTERY DISEASE WITH ANGINA PECTORIS, UNSPECIFIED VESSEL OR LESION TYPE, UNSPECIFIED WHETHER NATIVE OR TRANSPLANTED HEART (HCC): Primary | ICD-10-CM

## 2019-09-30 PROCEDURE — 99213 OFFICE O/P EST LOW 20 MIN: CPT | Performed by: INTERNAL MEDICINE

## 2019-09-30 PROCEDURE — 93000 ELECTROCARDIOGRAM COMPLETE: CPT | Performed by: INTERNAL MEDICINE

## 2019-09-30 RX ORDER — AMLODIPINE BESYLATE 10 MG/1
TABLET ORAL
Refills: 3 | Status: ON HOLD | COMMUNITY
Start: 2019-08-31 | End: 2021-10-27 | Stop reason: HOSPADM

## 2020-10-05 ENCOUNTER — OFFICE VISIT (OUTPATIENT)
Dept: CARDIOLOGY CLINIC | Age: 80
End: 2020-10-05
Payer: MEDICARE

## 2020-10-05 VITALS
RESPIRATION RATE: 16 BRPM | DIASTOLIC BLOOD PRESSURE: 60 MMHG | WEIGHT: 260.1 LBS | HEIGHT: 74 IN | SYSTOLIC BLOOD PRESSURE: 118 MMHG | HEART RATE: 63 BPM | BODY MASS INDEX: 33.38 KG/M2

## 2020-10-05 PROCEDURE — 93000 ELECTROCARDIOGRAM COMPLETE: CPT | Performed by: INTERNAL MEDICINE

## 2020-10-05 PROCEDURE — 99214 OFFICE O/P EST MOD 30 MIN: CPT | Performed by: INTERNAL MEDICINE

## 2020-10-05 RX ORDER — AZILSARTAN KAMEDOXOMIL AND CHLORTHALIDONE 40; 12.5 MG/1; MG/1
TABLET ORAL DAILY
COMMUNITY
End: 2022-08-31

## 2020-10-05 NOTE — PROGRESS NOTES
Sana Etienne  1940  Date of Service: 10/5/2020    Patient Active Problem List    Diagnosis Date Noted    Small bowel obstruction ([facility] 2019    Bowel obstruction ([facility] 2019    Cholelithiasis 2016    Obstruction of intestine 2016    Generalized abdominal pain 2016    Coronary artery disease      Overview Note:     A. Cardiac catheterization (09): Nondominant right coronary artery but with a large posterolateral branch supplying a portion of the inferior wall with 70% stenosis in the mid right coronary artery. B. Stenting of mid RCA with 3.5 x 12 mm  non drug-eluting stent.        Sleep apnea     BPH (benign prostatic hyperplasia)     Hyperlipidemia     Hypertension        Social History     Socioeconomic History    Marital status:      Spouse name: None    Number of children: None    Years of education: None    Highest education level: None   Occupational History    None   Social Needs    Financial resource strain: None    Food insecurity     Worry: None     Inability: None    Transportation needs     Medical: None     Non-medical: None   Tobacco Use    Smoking status: Former Smoker     Packs/day: 1.00     Years: 40.00     Pack years: 40.00     Types: Cigarettes     Last attempt to quit: 3/15/2003     Years since quittin.5    Smokeless tobacco: Never Used   Substance and Sexual Activity    Alcohol use: Yes     Comment: occasionally    Drug use: No    Sexual activity: None   Lifestyle    Physical activity     Days per week: None     Minutes per session: None    Stress: None   Relationships    Social connections     Talks on phone: None     Gets together: None     Attends [employer] service: None     Active member of club or organization: None     Attends meetings of clubs or organizations: None     Relationship status: None    Intimate partner violence     Fear of current or ex partner: None     Emotionally abused: None Physically abused: None     Forced sexual activity: None   Other Topics Concern    None   Social History Narrative    None       Current Outpatient Medications   Medication Sig Dispense Refill    Azilsartan-Chlorthalidone (EDARBYCLOR) 40-12.5 MG TABS Take by mouth daily      amLODIPine (NORVASC) 10 MG tablet TAKE ONE TABLET BY MOUTH DAILY  3    atorvastatin (LIPITOR) 20 MG tablet TAKE 1 TABLET BY MOUTH EVERY DAY  2    Cyanocobalamin (VITAMIN B-12 IJ) Inject as directed every 30 days      vitamin D (ERGOCALCIFEROL) 08656 UNITS CAPS capsule Take 50,000 Units by mouth twice a week       aspirin (ASPIRIN CHILDRENS) 81 MG chewable tablet Take 1 tablet by mouth daily. 30 tablet 3    metoprolol (TOPROL XL) 25 MG XL tablet Take 25 mg by mouth 2 times daily       amLODIPine (NORVASC) 5 MG tablet TAKE 1 AND 1/2 TABLET BY MOUTH ONCE DAILY AS DIRECTED (Patient not taking: No sig reported) 135 tablet 3    Probiotic Product (PROBIOTIC-10 PO) Take 1 tablet by mouth daily       No current facility-administered medications for this visit. No Known Allergies    Chief Complaint:  Romina Dominguez is here today for follow up and management/recomendations for CAD     History of Present Illness: Romina Dominguez states that He does house work, goes up the stairs, does yard work & goes shopping & takes care of his 5 acre property. He also walks up to a mile on the bike trail. He denies any chest discomfort, dyspnea on exertion, orthopnea/PND, or lower extremity edema. He denies any palpitations or presyncopal symptoms. REVIEW OF SYSTEMS:  As above. Patient does not complain of any fever, chills, nausea, vomiting or diarrhea. No focal, motor or neurological deficits. No changes in his/her vision, hearing, bowel or bladder habits. He is not known to have a history of thyroid problems. No recent nose bleeds.     PHYSICAL EXAM:  Vitals:    10/05/20 0951   BP: 118/60   Pulse: 63   Resp: 16   Weight: 260 lb 1.6 oz (118 kg)   Height: 6' 2\" (1.88 m)       GENERAL:  He is alert and oriented x 3, communicates well, in no distress. NECK:  No masses, trachea is mid position. Supple, full ROM, no JVD or bruits. No palpable thyromegaly or lymphadenopathy. HEART: Mildly distant. Regular rate and rhythm. Normal S1 and S2. There are no abnormal murmurs or gallops. LUNGS:  Clear to auscultation bilaterally. No use of accessory muscles. symmetrical excursion. Mildly decreased air movement. ABDOMEN:  Soft, non-tender. Normal bowel sounds. Morbidly obese. EXTREMITIES:  Full ROM x 4. No bilateral lower extremity edema. Good distal pulses. EYES:  Extraocular muscles intact. PERRL. Normal lids & conjunctiva. ENT:  Nares are clear & not bleeding. Moist mucosa. Normal lips formation. No external masses   NEURO: no tremors, full ROM x 4, EOMI. SKIN:  Warm, dry and intact. Normal turgor. EKG: Sinus rhythm, 63 bpm, nl axis, nonspecific ST - T wave changes. Right bundle branch block. Assessment:   1. Coronary artery disease as outlined above. No symptoms of recurring ischemia at this time. 2. Obesity  3. Sleep apnea. 4. Hypertension, well controled at this time. 5. Hypercholesterolemia  6. Knee DJD. Asking for preop cardiac risk stratification for knee surgery. Recommendations:  1. Continue to follow the cholesterol with Dr. Hayes Gross. 2. Continue current cardiac medications. 3. He performs far more than 4 METS of physical activities with no cardiac symptoms. Low risk for perioperative ischemic cardiac events for knee surgery. I recommend that he continue his cardiac medications perioperatively. I recommend that he continue the aspirin perioperatively unless there is an absolute contraindication for surgery. Thank you for allowing me to participate in your patient's care.       [id number], [address] [facility]  Interventional Cardiology

## 2020-11-05 ENCOUNTER — HOSPITAL ENCOUNTER (OUTPATIENT)
Age: 80
Discharge: HOME | End: 2020-11-07

## 2020-11-05 LAB
ABO/RH: NORMAL
ANTIBODY SCREEN: NORMAL

## 2020-11-05 PROCEDURE — 86901 BLOOD TYPING SEROLOGIC RH(D): CPT

## 2020-11-05 PROCEDURE — 86900 BLOOD TYPING SEROLOGIC ABO: CPT

## 2020-11-05 PROCEDURE — 86850 RBC ANTIBODY SCREEN: CPT

## 2020-11-13 ENCOUNTER — HOSPITAL ENCOUNTER (OUTPATIENT)
Age: 80
Discharge: HOME | End: 2020-11-15

## 2020-11-13 LAB
ANION GAP SERPL CALCULATED.3IONS-SCNC: 9 MMOL/L (ref 7–16)
BUN BLDV-MCNC: 18 MG/DL (ref 8–23)
CALCIUM SERPL-MCNC: 7.9 MG/DL (ref 8.6–10.2)
CHLORIDE BLD-SCNC: 107 MMOL/L (ref 98–107)
CO2: 22 MMOL/L (ref 22–29)
CREAT SERPL-MCNC: 1.1 MG/DL (ref 0.7–1.2)
GFR AFRICAN AMERICAN: >60
GFR NON-AFRICAN AMERICAN: >60 ML/MIN/1.73
GLUCOSE BLD-MCNC: 132 MG/DL (ref 74–99)
HCT VFR BLD CALC: 36.1 % (ref 37–54)
HEMOGLOBIN: 12.6 G/DL (ref 12.5–16.5)
MCH RBC QN AUTO: 31 PG (ref 26–35)
MCHC RBC AUTO-ENTMCNC: 34.9 % (ref 32–34.5)
MCV RBC AUTO: 88.7 FL (ref 80–99.9)
PDW BLD-RTO: 14 FL (ref 11.5–15)
PLATELET # BLD: 200 E9/L (ref 130–450)
PMV BLD AUTO: 9 FL (ref 7–12)
POTASSIUM SERPL-SCNC: 4 MMOL/L (ref 3.5–5)
RBC # BLD: 4.07 E12/L (ref 3.8–5.8)
SODIUM BLD-SCNC: 138 MMOL/L (ref 132–146)
WBC # BLD: 11.2 E9/L (ref 4.5–11.5)

## 2020-11-13 PROCEDURE — 80048 BASIC METABOLIC PNL TOTAL CA: CPT

## 2020-11-13 PROCEDURE — 85027 COMPLETE CBC AUTOMATED: CPT

## 2021-06-08 ENCOUNTER — TELEPHONE (OUTPATIENT)
Dept: ADMINISTRATIVE | Age: 81
End: 2021-06-08

## 2021-06-08 DIAGNOSIS — R06.09 DOE (DYSPNEA ON EXERTION): Primary | ICD-10-CM

## 2021-06-08 NOTE — TELEPHONE ENCOUNTER
Patient notified of Dr. Yves Causey recommendation. Order placed for echo. F/U scheduled for 6/15/21 at 1:00 p.m.

## 2021-06-08 NOTE — TELEPHONE ENCOUNTER
Pt calling to schedule an OV with Dr. Poly Camilo (due for a yearly in Oct) C/o of \"labored breath with exercise\" for the past 2 weeks. He had a bout of bronchitis, which he was treated for - but he is not sure if that may still be the issue. Please call him with further advise/apt.

## 2021-06-15 ENCOUNTER — OFFICE VISIT (OUTPATIENT)
Dept: CARDIOLOGY CLINIC | Age: 81
End: 2021-06-15
Payer: MEDICARE

## 2021-06-15 VITALS
RESPIRATION RATE: 14 BRPM | BODY MASS INDEX: 31.57 KG/M2 | SYSTOLIC BLOOD PRESSURE: 112 MMHG | HEART RATE: 69 BPM | DIASTOLIC BLOOD PRESSURE: 68 MMHG | HEIGHT: 74 IN | WEIGHT: 246 LBS

## 2021-06-15 DIAGNOSIS — R06.09 DOE (DYSPNEA ON EXERTION): ICD-10-CM

## 2021-06-15 DIAGNOSIS — I25.119 CORONARY ARTERY DISEASE WITH ANGINA PECTORIS, UNSPECIFIED VESSEL OR LESION TYPE, UNSPECIFIED WHETHER NATIVE OR TRANSPLANTED HEART (HCC): Primary | ICD-10-CM

## 2021-06-15 DIAGNOSIS — I49.3 ASYMPTOMATIC PVCS: ICD-10-CM

## 2021-06-15 LAB
ANION GAP SERPL CALCULATED.3IONS-SCNC: 11 MMOL/L (ref 7–16)
BUN BLDV-MCNC: 16 MG/DL (ref 6–23)
CALCIUM SERPL-MCNC: 9.4 MG/DL (ref 8.6–10.2)
CHLORIDE BLD-SCNC: 105 MMOL/L (ref 98–107)
CO2: 23 MMOL/L (ref 22–29)
CREAT SERPL-MCNC: 1 MG/DL (ref 0.7–1.2)
GFR AFRICAN AMERICAN: >60
GFR NON-AFRICAN AMERICAN: >60 ML/MIN/1.73
GLUCOSE BLD-MCNC: 112 MG/DL (ref 74–99)
MAGNESIUM: 2 MG/DL (ref 1.6–2.6)
POTASSIUM SERPL-SCNC: 4.2 MMOL/L (ref 3.5–5)
SODIUM BLD-SCNC: 139 MMOL/L (ref 132–146)

## 2021-06-15 PROCEDURE — 93000 ELECTROCARDIOGRAM COMPLETE: CPT | Performed by: INTERNAL MEDICINE

## 2021-06-15 PROCEDURE — 99214 OFFICE O/P EST MOD 30 MIN: CPT | Performed by: INTERNAL MEDICINE

## 2021-06-15 RX ORDER — FUROSEMIDE 20 MG/1
20 TABLET ORAL DAILY
Qty: 1 TABLET | Refills: 0 | Status: ON HOLD
Start: 2021-06-15 | End: 2021-10-26

## 2021-06-15 NOTE — PROGRESS NOTES
Elder Cristel  1940  Date of Service: 6/15/2021    Patient Active Problem List    Diagnosis Date Noted    Small bowel obstruction ([facility] [address] 2019    Bowel obstruction ([facility] 2019    Cholelithiasis 2016    Obstruction of intestine 2016    Generalized abdominal pain 2016    Coronary artery disease      Overview Note:     A. Cardiac catheterization (09): Nondominant right coronary artery but with a large posterolateral branch supplying a portion of the inferior wall with 70% stenosis in the mid right coronary artery. B. Stenting of mid RCA with 3.5 x 12 mm  non drug-eluting stent.  Sleep apnea     BPH (benign prostatic hyperplasia)     Hyperlipidemia     Hypertension        Social History     Socioeconomic History    Marital status:      Spouse name: None    Number of children: None    Years of education: None    Highest education level: None   Occupational History    None   Tobacco Use    Smoking status: Former Smoker     Packs/day: 1.00     Years: 40.00     Pack years: 40.00     Types: Cigarettes     Quit date: 3/15/2003     Years since quittin.2    Smokeless tobacco: Never Used   Vaping Use    Vaping Use: Never used   Substance and Sexual Activity    Alcohol use: Yes     Comment: occasionally    Drug use: No    Sexual activity: None   Other Topics Concern    None   Social History Narrative    None     Social Determinants of Health     Financial Resource Strain:     Difficulty of Paying Living Expenses:    Food Insecurity:     Worried About Running Out of Food in the Last Year:     Ran Out of Food in the Last Year:    Transportation Needs:     Lack of Transportation (Medical):      Lack of Transportation (Non-Medical):    Physical Activity:     Days of Exercise per Week:     Minutes of Exercise per Session:    Stress:     Feeling of Stress :    Social Connections:     Frequency of Communication with Friends and Family:  Frequency of Social Gatherings with Friends and Family:     Attends [employer] Services:     Active Member of Clubs or Organizations:     Attends Club or Organization Meetings:     Marital Status:    Intimate Partner Violence:     Fear of Current or Ex-Partner:     Emotionally Abused:     Physically Abused:     Sexually Abused:        Current Outpatient Medications   Medication Sig Dispense Refill    Azilsartan-Chlorthalidone (EDARBYCLOR) 40-12.5 MG TABS Take by mouth daily      amLODIPine (NORVASC) 10 MG tablet TAKE ONE TABLET BY MOUTH DAILY  3    Probiotic Product (PROBIOTIC-10 PO) Take 1 tablet by mouth daily      atorvastatin (LIPITOR) 20 MG tablet TAKE 1 TABLET BY MOUTH EVERY DAY  2    Cyanocobalamin (VITAMIN B-12 IJ) Inject as directed every 30 days      vitamin D (ERGOCALCIFEROL) 23040 UNITS CAPS capsule Take 50,000 Units by mouth twice a week       aspirin (ASPIRIN CHILDRENS) 81 MG chewable tablet Take 1 tablet by mouth daily. 30 tablet 3    metoprolol (TOPROL XL) 25 MG XL tablet Take 25 mg by mouth 2 times daily       amLODIPine (NORVASC) 5 MG tablet TAKE 1 AND 1/2 TABLET BY MOUTH ONCE DAILY AS DIRECTED (Patient not taking: No sig reported) 135 tablet 3     No current facility-administered medications for this visit. No Known Allergies    Chief Complaint:  Paulina Tomas is here today for follow up and management/recomendations for CAD     History of Present Illness: Paulina Tomas states that He does house work, goes up the stairs, does yard work & goes shopping. He states that he had bronchitis. He was on antibiotics and steroids for 5 days. This did not help. Therefore he underwent another course of antibiotics and steroids for 7 days. He states that this did improve his symptoms but he still has some dyspnea with exertion. He denies any chest discomfort, orthopnea/PND, or lower extremity edema. He denies any palpitations or presyncopal symptoms.       REVIEW OF SYSTEMS:  As above. Patient does not complain of any fever, chills, nausea, vomiting or diarrhea. No focal, motor or neurological deficits. No changes in his/her vision, hearing, bowel or bladder habits. He is not known to have a history of thyroid problems. No recent nose bleeds. PHYSICAL EXAM:  Vitals:    06/15/21 1307   BP: 112/68   Pulse: 69   Resp: 14   Weight: 246 lb (111.6 kg)   Height: 6' 2\" (1.88 m)       GENERAL:  He is alert and oriented x 3, communicates well, in no distress. NECK:  No masses, trachea is mid position. Supple, full ROM, mild JVD or bruits. No palpable thyromegaly or lymphadenopathy. HEART: Regular rate and rhythm. Normal S1 and S2. There are no abnormal murmurs or gallops. LUNGS: Mildly decreased air movement. Clear to auscultation bilaterally. No use of accessory muscles. symmetrical excursion. ABDOMEN:  Soft, non-tender. Normal bowel sounds. Morbidly obese. EXTREMITIES:  Full ROM x 4. Mild bilateral lower extremity edema. Good distal pulses. EYES:  Extraocular muscles intact. PERRL. Normal lids & conjunctiva. ENT:  Nares are clear & not bleeding. Moist mucosa. Normal lips formation. No external masses   NEURO: no tremors, full ROM x 4, EOMI. SKIN:  Warm, dry and intact. Normal turgor. EKG: Sinus rhythm, 69 bpm, nl axis, nonspecific ST - T wave changes. Right bundle branch block. 1 PAC and 2 PVCs. Assessment:   1. Coronary artery disease as outlined above. 2. Recent bronchitis  3. Persistent dyspnea with exertion as described above. 4. 1 PAC and 2 PVCs on today's ECG. 5. Mild lower extremity edema today. 6. Obesity  7. Sleep apnea. 8. Hypertension, well controled at this time. 9. Hypercholesterolemia      Recommendations:  1. Continue to follow the cholesterol with Dr. Patricia Grant. 2. Echocardiogram  3. BMP and magnesium  4. 1 dose of Lasix. 5. Lexiscan Cardiolite stress test.  Prior knee surgery.       Thank you for allowing me to participate in your patient's care.       [id number], [address] [facility]  Interventional Cardiology

## 2021-06-16 ENCOUNTER — HOSPITAL ENCOUNTER (OUTPATIENT)
Dept: CARDIOLOGY | Age: 81
Discharge: HOME | End: 2021-06-16
Payer: MEDICARE

## 2021-06-16 DIAGNOSIS — R06.09 DOE (DYSPNEA ON EXERTION): ICD-10-CM

## 2021-06-16 LAB
LV EF: 60 %
LVEF MODALITY: NORMAL

## 2021-06-16 PROCEDURE — 93306 TTE W/DOPPLER COMPLETE: CPT | Performed by: PSYCHIATRY & NEUROLOGY

## 2021-06-18 ENCOUNTER — TELEPHONE (OUTPATIENT)
Dept: CARDIOLOGY CLINIC | Age: 81
End: 2021-06-18

## 2021-06-18 NOTE — TELEPHONE ENCOUNTER
----- Message from Kirstin Chen DO sent at 6/18/2021  8:26 AM EDT -----  Let him know that his heart and valves are functioning very well. No cardiac etiologies for shortness of breath on this study. Patient Education     Leg Swelling in a Single Leg  Swelling of the arms, feet, ankles, and legs is called edema. It is caused by extra fluid collecting in the tissues. Because of gravity, extra fluid in the body settles to the lowest part. That is why the legs and feet are most affected. You have swelling in a single leg.  Some of the causes for swelling in only a single leg include:  · Infection in the foot or leg  · Long-term problem with a vein not working well (venous insufficiency)  · Swollen, twisted vein in the leg (varicose veins)  · Insect bite or sting on the foot or leg  · Injury or recent surgery on the foot or leg  · Blood clot in a deep vein of the leg (deep vein thrombosis or DVT)  · Inflammation of the joints of the lower leg  Medical treatment will depend on what is causing your swelling.  Home care  Follow these guidelines when caring for yourself at home:  · Don’t wear tight clothing.  · Keep your legs up while lying or sitting.  · Take any medicines as directed.  · If infection, injury, or recent surgery is the cause of your swelling, stay off your legs as much as possible until your symptoms get better.  · If you have venous insufficiency or varicose veins, don’t sit or  one place for long periods of time. Take breaks and walk around every few hours. Talk with your healthcare provider about wearing support stockings to help lessen swelling during the day.  · Wear compression stockings with your doctor's approval  Follow-up care  Follow up with your healthcare provider as advised.  Call 911  Call 911 if any of these occur:  · Shortness of breath or trouble breathing  · Chest pain  · Coughing up blood  · Fainting or loss of consciousness   When to seek medical advice  Call your healthcare provider right away if any of these occur:  · Increased pain, swelling, warmth, or redness of the leg, ankle, or foot  · Fever of 100.4°F (38ºC) or higher, or as directed by your healthcare  provider  · Weakness or dizziness  · Shaking chills  · Drenching sweats  Date Last Reviewed: 4/11/2016  © 3683-1501 [employer]. [address] [place] [address], [place] [address]. All rights reserved. This information is not intended as a substitute for professional medical care. Always follow your healthcare professional's instructions.

## 2021-08-04 PROBLEM — R06.02 SHORTNESS OF BREATH: Status: ACTIVE | Noted: 2021-08-04

## 2021-08-06 ENCOUNTER — TELEPHONE (OUTPATIENT)
Dept: CARDIOLOGY | Age: 81
End: 2021-08-06

## 2021-08-09 ENCOUNTER — TELEPHONE (OUTPATIENT)
Dept: CARDIOLOGY | Age: 81
End: 2021-08-09

## 2021-08-09 ENCOUNTER — HOSPITAL ENCOUNTER (OUTPATIENT)
Dept: PULMONOLOGY | Age: 81
Discharge: HOME | End: 2021-08-09
Payer: MEDICARE

## 2021-08-09 DIAGNOSIS — R06.02 SHORTNESS OF BREATH: ICD-10-CM

## 2021-08-09 PROCEDURE — 94726 PLETHYSMOGRAPHY LUNG VOLUMES: CPT

## 2021-08-09 PROCEDURE — 94729 DIFFUSING CAPACITY: CPT

## 2021-08-09 PROCEDURE — 94060 EVALUATION OF WHEEZING: CPT

## 2021-08-13 NOTE — PROCEDURES
[address] [address]                               PULMONARY FUNCTION    PATIENT NAME: Conchis Fritz                    :        1940  MED REC NO:   [id number]                            ROOM:  ACCOUNT NO:   [de-identified]                           ADMIT DATE: 2021  PROVIDER:     Prema Shen MD    DATE OF PROCEDURE:  2021    INDICATIONS:  An 42-year-old male, 74 inches height, 246 pounds. FINDINGS:  FVC is 3.28, 74% predicted prebronchodilator and 3.54, 80%  predicted postbronchodilator. FEV1 is 2.53, 78% of predicted  prebronchodilator and 2.7, 83% predicted postbronchodilator. FEF  25%-75% is 2.2, 100% of predicted prebronchodilator and 2.9, 131%  predicted postbronchodilator. Ratio is 77, 104% predicted  prebronchodilator and 76, 102% predicted postbronchodilator. MVV is  108, 84% predicted. SVC is 3.1, 70% predicted prebronchodilator and  2.83, 63% predicted postbronchodilator. RV 4.31, 149% predicted  prebronchodilator and 3.95. 136% predicted postbronchodilator. TLC is  7.4, 94% predicted prebronchodilator and 6.77, 86% predicted  postbronchodilator. Ratio is 58, 149% predicted, both pre and  postbronchodilator. DLCO is 20.09, 53% of predicted and ratio 3.18, 66%  predicted. IMPRESSION:  Mild obstructive lung disease with air trapping and a  significant bronchodilator response in the FEF 25%-75% only. There is a  moderate reduction in DLCO, which may indicate parenchymal or vascular  damage. Please correlate clinically.         Kedar Loya MD    D: 2021 11:00:25       T: 2021 12:21:55     [email or web address]  Job#: [id number]     Doc#: [id number]    CC:

## 2021-10-25 ENCOUNTER — HOSPITAL ENCOUNTER (INPATIENT)
Age: 81
LOS: 2 days | Discharge: HOME | DRG: 244 | End: 2021-10-27
Attending: EMERGENCY MEDICINE | Admitting: INTERNAL MEDICINE
Payer: MEDICARE

## 2021-10-25 ENCOUNTER — APPOINTMENT (OUTPATIENT)
Dept: GENERAL RADIOLOGY | Age: 81
DRG: 244 | End: 2021-10-25
Payer: MEDICARE

## 2021-10-25 DIAGNOSIS — R53.83 FATIGUE, UNSPECIFIED TYPE: ICD-10-CM

## 2021-10-25 DIAGNOSIS — I44.2 AV BLOCK, 3RD DEGREE (HCC): Primary | ICD-10-CM

## 2021-10-25 LAB
ABO/RH: NORMAL
ALBUMIN SERPL-MCNC: 3.8 G/DL (ref 3.5–5.2)
ALP BLD-CCNC: 103 U/L (ref 40–129)
ALT SERPL-CCNC: 13 U/L (ref 0–40)
ANION GAP SERPL CALCULATED.3IONS-SCNC: 13 MMOL/L (ref 7–16)
ANTIBODY SCREEN: NORMAL
APTT: 29.1 SEC (ref 24.5–35.1)
AST SERPL-CCNC: 16 U/L (ref 0–39)
BASOPHILS ABSOLUTE: 0.05 E9/L (ref 0–0.2)
BASOPHILS RELATIVE PERCENT: 0.5 % (ref 0–2)
BILIRUB SERPL-MCNC: 1.1 MG/DL (ref 0–1.2)
BUN BLDV-MCNC: 23 MG/DL (ref 6–23)
CALCIUM SERPL-MCNC: 8.9 MG/DL (ref 8.6–10.2)
CHLORIDE BLD-SCNC: 106 MMOL/L (ref 98–107)
CO2: 20 MMOL/L (ref 22–29)
CREAT SERPL-MCNC: 1.2 MG/DL (ref 0.7–1.2)
EOSINOPHILS ABSOLUTE: 0.37 E9/L (ref 0.05–0.5)
EOSINOPHILS RELATIVE PERCENT: 3.9 % (ref 0–6)
GFR AFRICAN AMERICAN: >60
GFR NON-AFRICAN AMERICAN: 58 ML/MIN/1.73
GLUCOSE BLD-MCNC: 130 MG/DL (ref 74–99)
HCT VFR BLD CALC: 40.5 % (ref 37–54)
HEMOGLOBIN: 13.5 G/DL (ref 12.5–16.5)
IMMATURE GRANULOCYTES #: 0.03 E9/L
IMMATURE GRANULOCYTES %: 0.3 % (ref 0–5)
INR BLD: 1.1
LYMPHOCYTES ABSOLUTE: 1.51 E9/L (ref 1.5–4)
LYMPHOCYTES RELATIVE PERCENT: 15.8 % (ref 20–42)
MAGNESIUM: 1.9 MG/DL (ref 1.6–2.6)
MCH RBC QN AUTO: 28.4 PG (ref 26–35)
MCHC RBC AUTO-ENTMCNC: 33.3 % (ref 32–34.5)
MCV RBC AUTO: 85.1 FL (ref 80–99.9)
MONOCYTES ABSOLUTE: 0.86 E9/L (ref 0.1–0.95)
MONOCYTES RELATIVE PERCENT: 9 % (ref 2–12)
NEUTROPHILS ABSOLUTE: 6.73 E9/L (ref 1.8–7.3)
NEUTROPHILS RELATIVE PERCENT: 70.5 % (ref 43–80)
PDW BLD-RTO: 14.4 FL (ref 11.5–15)
PLATELET # BLD: 210 E9/L (ref 130–450)
PMV BLD AUTO: 9.1 FL (ref 7–12)
POTASSIUM SERPL-SCNC: 3.7 MMOL/L (ref 3.5–5)
PRO-BNP: 130 PG/ML (ref 0–450)
PROTHROMBIN TIME: 12.2 SEC (ref 9.3–12.4)
RBC # BLD: 4.76 E12/L (ref 3.8–5.8)
SARS-COV-2, NAAT: NOT DETECTED
SODIUM BLD-SCNC: 139 MMOL/L (ref 132–146)
T3 FREE: 2.8 PG/ML (ref 2–4.4)
T4 FREE: 1 NG/DL (ref 0.93–1.7)
TOTAL CK: 47 U/L (ref 20–200)
TOTAL PROTEIN: 7.6 G/DL (ref 6.4–8.3)
TROPONIN, HIGH SENSITIVITY: 15 NG/L (ref 0–11)
TSH SERPL DL<=0.05 MIU/L-ACNC: 2.72 UIU/ML (ref 0.27–4.2)
WBC # BLD: 9.6 E9/L (ref 4.5–11.5)

## 2021-10-25 PROCEDURE — 6360000002 HC RX W HCPCS: Performed by: INTERNAL MEDICINE

## 2021-10-25 PROCEDURE — 2500000003 HC RX 250 WO HCPCS

## 2021-10-25 PROCEDURE — 33210 INSERT ELECTRD/PM CATH SNGL: CPT | Performed by: INTERNAL MEDICINE

## 2021-10-25 PROCEDURE — C1894 INTRO/SHEATH, NON-LASER: HCPCS

## 2021-10-25 PROCEDURE — 84481 FREE ASSAY (FT-3): CPT

## 2021-10-25 PROCEDURE — 83880 ASSAY OF NATRIURETIC PEPTIDE: CPT

## 2021-10-25 PROCEDURE — 80053 COMPREHEN METABOLIC PANEL: CPT

## 2021-10-25 PROCEDURE — 96374 THER/PROPH/DIAG INJ IV PUSH: CPT

## 2021-10-25 PROCEDURE — 2000000000 HC ICU R&B

## 2021-10-25 PROCEDURE — 71045 X-RAY EXAM CHEST 1 VIEW: CPT

## 2021-10-25 PROCEDURE — 36415 COLL VENOUS BLD VENIPUNCTURE: CPT

## 2021-10-25 PROCEDURE — 85025 COMPLETE CBC W/AUTO DIFF WBC: CPT

## 2021-10-25 PROCEDURE — 93005 ELECTROCARDIOGRAM TRACING: CPT | Performed by: EMERGENCY MEDICINE

## 2021-10-25 PROCEDURE — 85730 THROMBOPLASTIN TIME PARTIAL: CPT

## 2021-10-25 PROCEDURE — 92953 TEMPORARY EXTERNAL PACING: CPT

## 2021-10-25 PROCEDURE — 85610 PROTHROMBIN TIME: CPT

## 2021-10-25 PROCEDURE — 82550 ASSAY OF CK (CPK): CPT

## 2021-10-25 PROCEDURE — 2709999900 HC NON-CHARGEABLE SUPPLY

## 2021-10-25 PROCEDURE — 94640 AIRWAY INHALATION TREATMENT: CPT

## 2021-10-25 PROCEDURE — 99221 1ST HOSP IP/OBS SF/LOW 40: CPT | Performed by: SURGERY

## 2021-10-25 PROCEDURE — 87635 SARS-COV-2 COVID-19 AMP PRB: CPT

## 2021-10-25 PROCEDURE — APPSS60 APP SPLIT SHARED TIME 46-60 MINUTES: Performed by: PHYSICIAN ASSISTANT

## 2021-10-25 PROCEDURE — 84439 ASSAY OF FREE THYROXINE: CPT

## 2021-10-25 PROCEDURE — 2700000000 HC OXYGEN THERAPY PER DAY

## 2021-10-25 PROCEDURE — 84484 ASSAY OF TROPONIN QUANT: CPT

## 2021-10-25 PROCEDURE — 84443 ASSAY THYROID STIM HORMONE: CPT

## 2021-10-25 PROCEDURE — 2580000003 HC RX 258: Performed by: INTERNAL MEDICINE

## 2021-10-25 PROCEDURE — 99291 CRITICAL CARE FIRST HOUR: CPT | Performed by: INTERNAL MEDICINE

## 2021-10-25 PROCEDURE — 83735 ASSAY OF MAGNESIUM: CPT

## 2021-10-25 PROCEDURE — 86850 RBC ANTIBODY SCREEN: CPT

## 2021-10-25 PROCEDURE — 99222 1ST HOSP IP/OBS MODERATE 55: CPT | Performed by: INTERNAL MEDICINE

## 2021-10-25 PROCEDURE — 5A1223Z PERFORMANCE OF CARDIAC PACING, CONTINUOUS: ICD-10-PCS | Performed by: INTERNAL MEDICINE

## 2021-10-25 PROCEDURE — 6360000002 HC RX W HCPCS

## 2021-10-25 PROCEDURE — 6370000000 HC RX 637 (ALT 250 FOR IP): Performed by: INTERNAL MEDICINE

## 2021-10-25 PROCEDURE — 99285 EMERGENCY DEPT VISIT HI MDM: CPT

## 2021-10-25 PROCEDURE — 86900 BLOOD TYPING SEROLOGIC ABO: CPT

## 2021-10-25 PROCEDURE — 86901 BLOOD TYPING SEROLOGIC RH(D): CPT

## 2021-10-25 RX ORDER — ALBUTEROL SULFATE 90 UG/1
1 AEROSOL, METERED RESPIRATORY (INHALATION) EVERY 4 HOURS PRN
Status: DISCONTINUED | OUTPATIENT
Start: 2021-10-25 | End: 2021-10-25 | Stop reason: CLARIF

## 2021-10-25 RX ORDER — SODIUM CHLORIDE 9 MG/ML
25 INJECTION, SOLUTION INTRAVENOUS PRN
Status: DISCONTINUED | OUTPATIENT
Start: 2021-10-25 | End: 2021-10-27 | Stop reason: HOSPADM

## 2021-10-25 RX ORDER — SODIUM CHLORIDE 0.9 % (FLUSH) 0.9 %
5-40 SYRINGE (ML) INJECTION EVERY 12 HOURS SCHEDULED
Status: DISCONTINUED | OUTPATIENT
Start: 2021-10-25 | End: 2021-10-27 | Stop reason: HOSPADM

## 2021-10-25 RX ORDER — DOCUSATE SODIUM 100 MG/1
100 CAPSULE, LIQUID FILLED ORAL NIGHTLY
Status: DISCONTINUED | OUTPATIENT
Start: 2021-10-25 | End: 2021-10-27 | Stop reason: HOSPADM

## 2021-10-25 RX ORDER — SODIUM CHLORIDE 0.9 % (FLUSH) 0.9 %
5-40 SYRINGE (ML) INJECTION PRN
Status: DISCONTINUED | OUTPATIENT
Start: 2021-10-25 | End: 2021-10-27 | Stop reason: HOSPADM

## 2021-10-25 RX ORDER — ALBUTEROL SULFATE 2.5 MG/3ML
2.5 SOLUTION RESPIRATORY (INHALATION) EVERY 4 HOURS PRN
Status: DISCONTINUED | OUTPATIENT
Start: 2021-10-25 | End: 2021-10-27 | Stop reason: HOSPADM

## 2021-10-25 RX ORDER — 0.9 % SODIUM CHLORIDE 0.9 %
500 INTRAVENOUS SOLUTION INTRAVENOUS ONCE
Status: DISCONTINUED | OUTPATIENT
Start: 2021-10-25 | End: 2021-10-27 | Stop reason: HOSPADM

## 2021-10-25 RX ORDER — ACETAMINOPHEN 325 MG/1
650 TABLET ORAL EVERY 4 HOURS PRN
Status: DISCONTINUED | OUTPATIENT
Start: 2021-10-25 | End: 2021-10-27 | Stop reason: HOSPADM

## 2021-10-25 RX ORDER — ONDANSETRON 2 MG/ML
4 INJECTION INTRAMUSCULAR; INTRAVENOUS EVERY 6 HOURS PRN
Status: DISCONTINUED | OUTPATIENT
Start: 2021-10-25 | End: 2021-10-27 | Stop reason: HOSPADM

## 2021-10-25 RX ORDER — ONDANSETRON 2 MG/ML
4 INJECTION INTRAMUSCULAR; INTRAVENOUS EVERY 6 HOURS PRN
Status: CANCELLED | OUTPATIENT
Start: 2021-10-25

## 2021-10-25 RX ORDER — ARFORMOTEROL TARTRATE 15 UG/2ML
15 SOLUTION RESPIRATORY (INHALATION) 2 TIMES DAILY
Status: DISCONTINUED | OUTPATIENT
Start: 2021-10-25 | End: 2021-10-27 | Stop reason: HOSPADM

## 2021-10-25 RX ORDER — ASPIRIN 81 MG/1
81 TABLET, CHEWABLE ORAL DAILY
Status: DISCONTINUED | OUTPATIENT
Start: 2021-10-25 | End: 2021-10-27 | Stop reason: HOSPADM

## 2021-10-25 RX ORDER — ATORVASTATIN CALCIUM 20 MG/1
20 TABLET, FILM COATED ORAL DAILY
Status: DISCONTINUED | OUTPATIENT
Start: 2021-10-26 | End: 2021-10-27 | Stop reason: HOSPADM

## 2021-10-25 RX ADMIN — GLUCAGON HYDROCHLORIDE 2 MG: KIT at 13:30

## 2021-10-25 RX ADMIN — ASPIRIN 81 MG CHEWABLE TABLET 81 MG: 81 TABLET CHEWABLE at 21:30

## 2021-10-25 RX ADMIN — Medication 5 ML: at 22:18

## 2021-10-25 RX ADMIN — DOCUSATE SODIUM 100 MG: 100 CAPSULE, LIQUID FILLED ORAL at 22:18

## 2021-10-25 RX ADMIN — IPRATROPIUM BROMIDE 0.5 MG: 0.5 SOLUTION RESPIRATORY (INHALATION) at 21:30

## 2021-10-25 RX ADMIN — ARFORMOTEROL TARTRATE 15 MCG: 15 SOLUTION RESPIRATORY (INHALATION) at 21:29

## 2021-10-25 NOTE — ED NOTES
EP at bedside.   Notified this RN that they will come get pt for Pacemaker      Shane Nayak, RN  10/25/21 [id number]

## 2021-10-25 NOTE — H&P
Hospital Medicine History & Physical      PCP: SABINA Antunez - CNP    Date of Admission: 10/25/2021    Date of Service:  Oct 25, 2021      Chief Complaint:  *WEAKNESS      History Of Present Illness:     80 y.o. male who presented to [facility] with * WEAKNESS, sob, NO CHEST PAIN, NV,OR DIAPHORESIS, NEVER HAD BEFORE , THOUGHT HE WAS GOING TO PASS OUT. Past Medical History:          Diagnosis Date    BPH (benign prostatic hyperplasia)     Bradycardia 10/25/2021    Emergent TVP placed    Coronary artery disease     Diverticulitis of colon     Hyperlipidemia     Hypertension     Intestinal ulcer     PUD (peptic ulcer disease)     Sleep apnea     Possible       Past Surgical History:          Procedure Laterality Date    CORONARY ANGIOPLASTY  7/13/09    DIAGNOSTIC CARDIAC CATH LAB PROCEDURE  7/13/09    NOSE SURGERY      SMALL INTESTINE SURGERY         Medications Prior to Admission:      Prior to Admission medications    Medication Sig Start Date End Date Taking?  Authorizing Provider   tiotropium-olodaterol (STIOLTO RESPIMAT) 2.5-2.5 MCG/ACT AERS Inhale 2 puffs into the lungs daily 9/10/21   Ethan Virk MD   glycopyrrolate-formoterol (BEVESPI) 9-4.8 MCG/ACT AERO Inhale 2 puffs into the lungs 2 times daily 9/8/21   Ethan Virk MD   fluticasone [facility]) 50 MCG/ACT nasal spray 1 spray by Each Nostril route daily 8/19/21   Ethan Virk MD   albuterol sulfate  (90 Base) MCG/ACT inhaler INHALE 2 PUFFS INTO THE LUNGS EVERY 4 HOURS AS NEEDED 5/3/21   Historical Provider, MD   furosemide (LASIX) 20 MG tablet Take 1 tablet by mouth daily  Patient not taking: Reported on 8/4/2021 6/15/21   Iona Noel DO   Azilsartan-Chlorthalidone (EDARBYCLOR) 40-12.5 MG TABS Take by mouth daily    Historical Provider, MD   amLODIPine (NORVASC) 10 MG tablet TAKE ONE TABLET BY MOUTH DAILY 8/31/19   Historical Provider, MD   Probiotic Product (PROBIOTIC-10 PO) Take 1 tablet by mouth daily    Historical Provider, MD   atorvastatin (LIPITOR) 20 MG tablet TAKE 1 TABLET BY MOUTH EVERY DAY 9/17/18   Historical Provider, MD   Cyanocobalamin (VITAMIN B-12 IJ) Inject as directed every 30 days    Historical Provider, MD   vitamin D (ERGOCALCIFEROL) 77467 UNITS CAPS capsule Take 5,000 Units by mouth twice a week     Historical Provider, MD   aspirin (ASPIRIN CHILDRENS) 81 MG chewable tablet Take 1 tablet by mouth daily. 10/13/14   Ke Lyles MD   metoprolol (TOPROL XL) 25 MG XL tablet Take 25 mg by mouth 2 times daily     Historical Provider, MD       Allergies:  Patient has no known allergies. Social History:      The patient currently lives *WIFE    TOBACCO:   reports that he quit smoking about 26 years ago. His smoking use included cigarettes. He started smoking about 61 years ago. He has a 35.00 pack-year smoking history. He has never used smokeless tobacco.  ETOH:   reports current alcohol use. Family History:      *Reviewed in detail and negative for DM, CAD, Cancer, CVA. Positive as follows:        Problem Relation Age of Onset    Heart Attack Father        REVIEW OF SYSTEMS:   Pertinent positives as noted in the HPI. All other systems reviewed and negative. PHYSICAL EXAM:    BP (!) 82/28   Pulse 72   Temp 98 °F (36.7 °C)   Resp 16   Wt 250 lb (113.4 kg)   SpO2 97%   BMI 32.10 kg/m²     General appearance:  No apparent distress, appears stated age and cooperative. HEENT:  Normal cephalic, atraumatic without obvious deformity. Pupils equal, round, and reactive to light. Extra ocular muscles intact. Conjunctivae/corneas clear. Neck: Supple, with full range of motion. No jugular venous distention. Trachea midline. Respiratory:  Normal respiratory effort. Clear to auscultation, bilaterally without Rales/Wheezes/Rhonchi.   Cardiovascular:  Regular rate and rhythm with normal S1/S2 without murmurs, rubs or gallops. Abdomen: Soft, non-tender, non-distended with normal bowel sounds. Musculoskeletal:  No clubbing, cyanosis or edema bilaterally. Full range of motion without deformity. Skin: Skin color, texture, turgor normal.  No rashes or lesions. Neurologic:  Neurovascularly intact without any focal sensory/motor deficits. Cranial nerves: II-XII intact, grossly non-focal.  Psychiatric:  Alert and oriented, thought content appropriate, normal insight  Capillary Refill: Brisk,< 3 seconds   Peripheral Pulses: +2 palpable, equal bilaterally       Labs:     Recent Labs     10/25/21  1334   WBC 9.6   HGB 13.5   HCT 40.5        Recent Labs     10/25/21  1334      K 3.7      CO2 20*   BUN 23   CREATININE 1.2   CALCIUM 8.9     Recent Labs     10/25/21  1334   AST 16   ALT 13   BILITOT 1.1   ALKPHOS 103     Recent Labs     10/25/21  1334   INR 1.1     Recent Labs     10/25/21  1334   CKTOTAL 47       Urinalysis:      Lab Results   Component Value Date    NITRU Negative 09/04/2019    WBCUA 2-5 09/04/2019    WBCUA 0-1 07/28/2011    BACTERIA RARE 09/04/2019    RBCUA NONE 09/04/2019    RBCUA 1-3 07/28/2011    BLOODU Negative 09/04/2019    SPECGRAV 1.025 09/04/2019    GLUCOSEU Negative 09/04/2019    GLUCOSEU NEGATIVE 07/28/2011       Radiology:     CXR: I have reviewed the CXR with the following interpretation:     XR CHEST PORTABLE   Final Result   1. There is no acute cardiopulmonary disease   2. Chronic scarring seen within the left lung base. XR CHEST PORTABLE    (Results Pending)       ASSESSMENT:    Active Hospital Problems    Diagnosis Date Noted    Third degree heart block ([address] [I44.2] 10/25/2021   HTN  HLD  CAD WITH STENT, H/O  COPD WITHOUT EXACERBATION        PLAN:  WENT FOR PACER  HOLD BB FOR NOW  CONT ARB  HOLD DIURETIC  CONT ASPIRIN  CONT LIPITOR      DVT Prophylaxis: *SCD  Diet: REG  Code Status: Full Code    PT/OT Eval Status:  WHEN STABLE    Dispo - *HOME    Electronically signed by Jeison Piper DO on 10/25/2021 at 5:33 PM CARLIE       Thank you SABINA Abarca CNP for the opportunity to be involved in this patient's care. If you have any questions or concerns please feel free to contact me at [phone].

## 2021-10-25 NOTE — CONSULTS
Inpatient Cardiology Consultation      Reason for Consult: Elevated troponin, CAD complicated by third-degree AV block, known to Dr. Parul Moya, family request    Consulting Physician: Berto Rivero DO    Requesting Physician:  Good Finch DO    Date of Consultation: 10/25/2021    HISTORY OF PRESENT ILLNESS OF Ajay Eugene located in  room 10/10:     Ajay Eugene is a 80 y.o. male  known to Dr. Berto Rivero DO    Patient has h/o CAD, s/p PCI of RCA on 7/13/2009, preserved left ventricular systolic function, stage I ventricle diastolic dysfunction, chronic chronic RBBB, chronic LAFB, HTN, HLD, h/o PUD, BPH, diverticulosis of colon, h/o diverticulitis of colon, obesity    Patient presented the EMS to ED of [facility] on 10/25/21 at about 1:28 PM complaining of generalized weakness/fatigue and low heart rate which was in 35s as patient noticed on his pulse oximeter at home before calling ambulance. Yesterday in the evening he felt lightheaded but did not seek any medical attention at that time. Today in the morning he was feeling good but  at about 10:30 AM he felt extremely weak and lightheaded, as if almost passing out. He used his pulse oximeter which showed good oxygenation but heart rate in 30s and ambulance was called. He denied having chest pains. His shortness of breath at effort since has had bronchitis 3 times this year and uses inhalers (according to the patient). On presentation to ED his vitals: /52 mmHg, pulse 34, 29,  33, 32, 39, 30; respiration 18, SPO2 94% on room air, nonfebrile. EP was consulted for third-degree AV block. Please note: past medical records were reviewed per electronic medical record (EMR) - see detailed reports under Past Medical/ Surgical History.    Past Medical History:    Past Medical History:   Diagnosis Date    BPH (benign prostatic hyperplasia)     Coronary artery disease     Diverticulitis of colon     Hyperlipidemia     Hypertension  Intestinal ulcer     PUD (peptic ulcer disease)     Sleep apnea     Possible       Past Surgical History:    Past Surgical History:   Procedure Laterality Date    CORONARY ANGIOPLASTY  7/13/09    DIAGNOSTIC CARDIAC CATH LAB PROCEDURE  7/13/09    NOSE SURGERY      SMALL INTESTINE SURGERY         Medications Prior to admit:  Prior to Admission medications    Medication Sig Start Date End Date Taking? Authorizing Provider   tiotropium-olodaterol (STIOLTO RESPIMAT) 2.5-2.5 MCG/ACT AERS Inhale 2 puffs into the lungs daily 9/10/21   Nena Hernandez MD   glycopyrrolate-formoterol (BEVESPI) 9-4.8 MCG/ACT AERO Inhale 2 puffs into the lungs 2 times daily 9/8/21   Nena Hernandez MD   fluticasone [facility]) 50 MCG/ACT nasal spray 1 spray by Each Nostril route daily 8/19/21   Nena Hernandez MD   albuterol sulfate  (90 Base) MCG/ACT inhaler INHALE 2 PUFFS INTO THE LUNGS EVERY 4 HOURS AS NEEDED 5/3/21   Historical Provider, MD   furosemide (LASIX) 20 MG tablet Take 1 tablet by mouth daily  Patient not taking: Reported on 8/4/2021 6/15/21   Chiara Davies DO   Azilsartan-Chlorthalidone (EDARBYCLOR) 40-12.5 MG TABS Take by mouth daily    Historical Provider, MD   amLODIPine (NORVASC) 10 MG tablet TAKE ONE TABLET BY MOUTH DAILY 8/31/19   Historical Provider, MD   Probiotic Product (PROBIOTIC-10 PO) Take 1 tablet by mouth daily    Historical Provider, MD   atorvastatin (LIPITOR) 20 MG tablet TAKE 1 TABLET BY MOUTH EVERY DAY 9/17/18   Historical Provider, MD   Cyanocobalamin (VITAMIN B-12 IJ) Inject as directed every 30 days    Historical Provider, MD   vitamin D (ERGOCALCIFEROL) 12737 UNITS CAPS capsule Take 5,000 Units by mouth twice a week     Historical Provider, MD   aspirin (ASPIRIN CHILDRENS) 81 MG chewable tablet Take 1 tablet by mouth daily.  10/13/14   Orly Guzman MD   metoprolol (TOPROL XL) 25 MG XL tablet Take 25 mg by mouth 2 times daily     Historical Provider, MD       Current Medications: Current Facility-Administered Medications: trimethobenzamide (TIGAN) injection 200 mg, 200 mg, IntraMUSCular, Q6H PRN  perflutren lipid microspheres (DEFINITY) injection 1.65 mg, 1.5 mL, IntraVENous, ONCE PRN  0.9 % sodium chloride bolus, 500 mL, IntraVENous, Once    Allergies:  Patient has no known allergies. Social History:    Social History     Socioeconomic History    Marital status:      Spouse name: Not on file    Number of children: Not on file    Years of education: Not on file    Highest education level: Not on file   Occupational History    Not on file   Tobacco Use    Smoking status: Former Smoker     Packs/day: 1.00     Years: 35.00     Pack years: 35.00     Types: Cigarettes     Start date:      Quit date:      Years since quittin.8    Smokeless tobacco: Never Used   Vaping Use    Vaping Use: Never used   Substance and Sexual Activity    Alcohol use: Yes     Comment: occasionally    Drug use: No    Sexual activity: Not on file   Other Topics Concern    Not on file   Social History Narrative    Not on file     Social Determinants of Health     Financial Resource Strain:     Difficulty of Paying Living Expenses:    Food Insecurity:     Worried About [address] [facility] in the Last Year:     [address] in the Last Year:    Transportation Needs:     Lack of Transportation (Medical):      Lack of Transportation (Non-Medical):    Physical Activity:     Days of Exercise per Week:     Minutes of Exercise per Session:    Stress:     Feeling of Stress :    Social Connections:     Frequency of Communication with Friends and Family:     Frequency of Social Gatherings with Friends and Family:     Attends Confucianist Services:     Active Member of Clubs or Organizations:     Attends Club or Organization Meetings:     Marital Status:    Intimate Partner Violence:     Fear of Current or Ex-Partner:     Emotionally Abused:     Physically Abused:     Sexually Abused:        Family History:   Family History   Problem Relation Age of Onset    Heart Attack Father          REVIEW OF SYSTEMS:     Constitutional: Has generalized tiredness as described in H&P. Denies fevers, chills, night sweats, shanthi loss, shanthi gain  HEENT: Denies headaches, nose bleeds, and blurred vision,oral pain, oral lesion. Neurological: Denies history of stroke, weakness, dizziness, numbness and tingling  Cardiovascular: Has shortness of breath effort. Lightheadedness as described in H&P. Denies chest pain,  palpitations, and feelings of heart racing. Respiratory: Denies cough, wheezing,  use of supplementary oxygen. Gastrointestinal: Denies heartburn, nausea, vomiting, diarrhea and constipation, black/bloody, and tarry stools. Genitourinary:Denies pain on  urination,  blood in urine, trouble starting urination, need to strain on urination, urinary urgency, urinary incontinence. Hematologic:  Denies history of easy bruising, prolonged bleeding,  of blood clots in legs  Lymphatic: Denies lumps and bumps to neck, axilla, breast, and groin  Endocrine: Denies excessive thirst. Denies intolerance to heat or cold  Musculoskeletal: Denies falls, pain to BLE with ambulation and edema to BLE. Psychiatric: Denies anxiety and depression. PHYSICAL EXAM:   BP (!) 98/46   Pulse (!) 30   Temp 98 °F (36.7 °C)   Resp 14   SpO2 39%   Systolic (31CBF), ZPF:676 , Min:98 , GEL:076    Diastolic (22CJQ), WIJ:19, Min:46, Max:81    CONST: Obese. Awake, alert, cooperative, no apparent distress  HEENT:   Head- Normocephalic, atraumatic   Eyes- Conjunctivae pink, anicteric  Throat- Oral mucosa pink and moist  Neck-  No stridor, trachea midline, no jugular venous distention. No adenopathy   CHEST: Chest symmetrical and non-tender to palpation.  No accessory muscle use or intercostal retractions  RESPIRATORY:  Lung sounds -basilar crackles   CARDIOVASCULAR:     No carotid bruits  Heart Inspection- shows no noted pulsations    Heart Ausculation-bradycardia. Regular rate and rhythm, no murmur. No s3, s4 or rub   PV: No lower extremity edema. No varicosities. Pedal pulses palpable, no clubbing or cyanosis   ABDOMEN: Soft, non-tender to light palpation. Bowel sounds present. MS: Moves all extremities. Good muscle strength and tone. No atrophy or abnormal movements. : Deferred  SKIN: Warm and dry,  no stasis dermatitis or ulcers on legs  NEURO / PSYCH: Oriented to person, place and time. Speech clear and appropriate. Follows all commands. Pleasant affect     DATA:    ECG reviewed with Dr. Kay Feng  Cardiac monitor: Complete AV block  Diagnostic:    Echo 06/16/2021      Study Location: Echo Lab  Technical Quality: Adequate visualization     Indications:Dyspnea/SOB.     Patient Status: Routine     Height: 74 inches Weight: 240 pounds BSA: 2.35 m^2 BMI: 30.81 kg/m^2     BP: 112/68 mmHg      Findings      Left Ventricle   Normal left ventricle size and systolic function   Stage I diastolic dysfunction   No wall motion abnormalities   Ejection fraction is visually estimated at 60%. Right Ventricle   Normal right ventricle structure and function. Left Atrium   Normal sized left atrium. Right Atrium   Normal right atrium size. Mitral Valve   Structurally normal mitral valve. No mitral regurgitation. No mitral stenosis. Tricuspid Valve   Trace tricuspid regurgitation. RVSP is 25 mmHg. Aortic Valve   Trileaflet aortic valve   No hemodynamically significant aortic stenosis is present. No evidence of aortic valve regurgitation      Pulmonic Valve   Mild to moderate pulmonic regurgitation. Pericardial Effusion   No evidence of pericardial effusion. Aorta   Aortic root dimension within normal limits. Miscellaneous   The inferior vena cava diameter is normal with normal respiratory   variation.       Conclusions      Summary   Normal left ventricle size and systolic function   Stage TRIG  LIVER PROFILE:  Recent Labs     10/25/21  1334   AST 16   ALT 13   LABALBU 3.8       COVID-19 Labs:  No results found for: COVID19  No results for input(s): COVID19 in the last 72 hours. ASSESSMENT:  Complete AV block-per EP  Elevated troponin x1, borderline, without chest pain, probably secondary to severe  bradycardia  CAD, s/p PCI of RCA on 7/13/2009, no chest pain  Chronic RBBB and LAFB  HTN, low PB secondary to bradycardia  HLD  FELIX  Obesity  H/o bronchitis        PLAN:  Continue cardiac monitor  Continue current treatment  Hold BB and medication which prolong AV node conduction   Per EP - TVP today and dual-chamber pacemaker implantation tomorrow  Electrolytes check and correction    BP control   Control of HR  Echo will be reviewed  Further cardiac recommendations will be forthcoming pending patient clinical course and echo results. Assessment and plan discussed with Dr. Dominic Melissa DO    Assessment and Plan to follow as per Dr. Dominic Melissa DO    Electronically signed by JENNIFER Goodrich on 10/25/2021 at 3:08 PM                 I have personally seen and evaluated the patient. I personally obtained the history and performed the physical exam.  I personally reviewed all of the above labs, history, review of systems, and data. All of the assessments and recommendations are from me. All of the above cardiac medical decisions are from me. Please see my additional contributions to the history, physical exam, assessment, and recommendations below.          History of chief complaint:  He had some lightheadedness yesterday afternoon. He was feeling very weak and almost passing out. He used his pulse ox and stated that his oxygen was good but his heart rate was in the 30s. Therefore he presented to the emergency room and was found to be in complete heart block. Electrophysiology was consulted and is planning an emergency pacemaker. He has had multiple episodes of bronchitis.   He has chronic dyspnea. He states that this is not changed from his baseline. He denies any chest discomfort. He now has just finished his temporary pacemaker insertion and is in the holding area in the Cath Lab.     Review of systems:                Heart: as above              Lungs: as above              Eyes: denies changes in vision or discharge. Ears: denies changes in hearing or pain. Nose: denies epistaxis or masses              Throat: denies sore throat or trouble swallowing. Neuro: denies numbness, tingling, tremors. Skin: denies rashes or itching. : denies hematuria, dysuria              GI: denies vomiting, diarrhea              Psych: denies mood changed, anxiety, depression.                   Physical exam:  BP (!) 82/28   Pulse 72   Temp 98 °F (36.7 °C)   Resp 16   Wt 250 lb (113.4 kg)   SpO2 97%   BMI 32.10 kg/m²   Constitutional: A&O x3, communicates well, no acute distress. Eyes: extraocular muscles intact, PERRL. Normal lids & conjunctiva. No icterus. ENT: clear, no bleeding. No external masses. Lips normal formation. Neck: supple, full ROM, no JVD, no bruits, no lymphadenopathy. No masses. trachea midline. Heart: Very distant to auscultation. Regular rate & rhythm, normal S1 & S2, not able to hear a murmur. No heave. Lungs: CTA. No accessory muscles. Decreased air movement. Abd: soft, non-tender. Normal bowel sounds. Obese. Neuro: Full ROM X 4, EOMI, no tremors. EXT: No bilateral lower extremity edema  Skin: warm, dry, intact. Good turgor. Psych: A&O x 3, normal behavior, not anxious.     Patient seen and examined. Chart, labs & data reviewed.     A:  1. Underlying baseline bifascicular block. He now presents with complete heart block, weakness, and near syncope. 2. Coronary artery disease. Asymptomatic at this time. 3. Recurring bronchitis.   4. Chronic dyspnea with exertion. 5. Hypertension  6. Hypercholesterolemia  7. Sleep apnea  8. Obesity        Rec:  1. Rhythm per EP. They just completed a temporary pacemaker and are planning a permanent pacemaker tomorrow. 2. Echocardiogram.  3. His heart rate lowering medications are on hold. Resume his beta-blocker after his permanent pacemaker has been inserted if okay with EP.     Electronically signed by Kurt Roa DO on 10/25/2021 at 4:19 PM     Note: This report was completed using computerized voice recognition software.  Every effort has been made to ensure accuracy, however; and invert and computerized transcription errors may be present.

## 2021-10-25 NOTE — ED NOTES
Cath Lab staff at bedside to . Wife at bedside. Handoff complete to go to cath lab.       Mary Bell RN  10/25/21 [id number]

## 2021-10-25 NOTE — OP NOTE
[address] [facility]  Procedure Report  PATIENT: Katharine Rodriguez  MEDICAL RECORD NUMBER: [id number]  DATE OF PROCEDURE:  10/25/2021  ATTENDING ELECTROPHYSIOLOGIST:Negin Flores MD  REFERRING PHYSICIAN: KENN Upton    Procedure Performed:  1. Temporary pacing wire placement  2. Vascular ultrasound for venous access  3. Fluoroscopy    Indication for Procedure:  1. Complete AV block with intermittent pauses up to 4.3 seconds    Flouroscopy: 0.7 min  Complications: none immediately apparent  EBL: minimal  Specimens: none  Contrast: 0 cc    FINDINGS:  Parameters:  Mode: VVI  Base Rate: 60  Output: 5 mA  Sensitivity: 3 mV  Threshold: 0.1 mA    DETAILS OF THE OPERATION: The risks, benefits, alternatives of the procedure were explained to patient and family. They consented and agreed to proceed. Blood products are not routinely needed for such procedures. The patient was brought to the Cardiac Cath lab in a non-sedated state. The patient had electrocardiographic and hemodynamic monitoring equipment placed. During the case the patient was under the care of cath lab staffs for sedation and hemodynamic monitoring. A final time out was performed prior to beginning the procedure. The patient was prepped and draped in usual sterile fashion. Ten mL of 2% lidocaine was used to anesthetize the right groin area. We utilized vascular ultrasound to document venous patency and to allow for direct visualization of vascular needle entry with permanent recording using the ultrasound system. Using modified Seldinger's technique, the right femoral vein was accessed. The J-tipped guidewire was inserted through the needle. The 6-Fr locking sheath was inserted over the guide wire. The dilator and the wire were removed. The temporary pacing wire was inserted into the sheath and was advanced into the RV apex under fluoroscopic guidance.  The pacing wire was connected to the temporary pacemaker. The threshold testing was performed and it was found to have adequate pacing threshold. The pacemaker was programmed as above. The sheath was sutured down to the subcutaneous tissue using 0-silk by the help of scrub technician. Tergaderm was placed over the sheath and the lead to secure the position at the right groin. At the end of the case, the patient was hemodynamically stable and under the care of the nursing stafft, the patient was brought back to the ICU for post procedural monitoring. ASSESSMENT:  1. Successful placement of temporary pacing wire. RECOMMENDATIONS:  1. Stat portable chest x-ray to check lead position. 2. EKG to be performed now.   3. Post-procedural monitoring in ICU  4. Bedrest.  5. Plan for dual chamber permanent pacemaker implantation tomorrow    Tamar Cheung MD  Cardiac Electrophysiology  [facility]  [facility] [place]: [facility]  4:28 PM  10/25/2021

## 2021-10-25 NOTE — CONSULTS
I have personally seen and evaluated the patient. I personally obtained the history and performed the physical exam.  I personally reviewed all of the above labs, history, review of systems, and data. All of the assessments and recommendations are from me. All of the above cardiac medical decisions are from me. Please see my additional contributions to the history, physical exam, assessment, and recommendations below. History of chief complaint:  He had some lightheadedness yesterday afternoon. He was feeling very weak and almost passing out. He used his pulse ox and stated that his oxygen was good but his heart rate was in the 30s. Therefore he presented to the emergency room and was found to be in complete heart block. Electrophysiology was consulted and is planning an emergency pacemaker. He has had multiple episodes of bronchitis. He has chronic dyspnea. He states that this is not changed from his baseline. He denies any chest discomfort. He now has just finished his temporary pacemaker insertion and is in the holding area in the Cath Lab. Review of systems:     Heart: as above   Lungs: as above   Eyes: denies changes in vision or discharge. Ears: denies changes in hearing or pain. Nose: denies epistaxis or masses   Throat: denies sore throat or trouble swallowing. Neuro: denies numbness, tingling, tremors. Skin: denies rashes or itching. : denies hematuria, dysuria   GI: denies vomiting, diarrhea   Psych: denies mood changed, anxiety, depression. Physical exam:  BP (!) 82/28   Pulse 72   Temp 98 °F (36.7 °C)   Resp 16   Wt 250 lb (113.4 kg)   SpO2 97%   BMI 32.10 kg/m²   Constitutional: A&O x3, communicates well, no acute distress. Eyes: extraocular muscles intact, PERRL. Normal lids & conjunctiva. No icterus. ENT: clear, no bleeding. No external masses. Lips normal formation. Neck: supple, full ROM, no JVD, no bruits, no lymphadenopathy. No masses. trachea midline. Heart: Very distant to auscultation. Regular rate & rhythm, normal S1 & S2, not able to hear a murmur. No heave. Lungs: CTA. No accessory muscles. Decreased air movement. Abd: soft, non-tender. Normal bowel sounds. Obese. Neuro: Full ROM X 4, EOMI, no tremors. EXT: No bilateral lower extremity edema  Skin: warm, dry, intact. Good turgor. Psych: A&O x 3, normal behavior, not anxious. Patient seen and examined. Chart, labs & data reviewed. A:  1. Underlying baseline bifascicular block. He now presents with complete heart block, weakness, and near syncope. 2. Coronary artery disease. Asymptomatic at this time. 3. Recurring bronchitis. 4. Chronic dyspnea with exertion. 5. Hypertension  6. Hypercholesterolemia  7. Sleep apnea  8. Obesity      Rec:  1. Rhythm per EP. They just completed a temporary pacemaker and are planning a permanent pacemaker tomorrow. 2. Echocardiogram.  3. His heart rate lowering medications are on hold. Resume his beta-blocker after his permanent pacemaker has been inserted if okay with EP. Electronically signed by Florence Dorman DO on 10/25/2021 at 4:19 PM    Note: This report was completed using computerized voice recognition software. Every effort has been made to ensure accuracy, however; and invert and computerized transcription errors may be present.

## 2021-10-25 NOTE — PROGRESS NOTES
Patient to cath lab from ED. Te,pory pacer placed via right groin. COVID swab obtained and sent to lab.

## 2021-10-25 NOTE — ED PROVIDER NOTES
Department of Emergency Medicine   ED  Provider Note  Admit Date/RoomTime: 10/25/2021  1:16 PM  ED Room: 10/10          History of Present Illness:  10/25/21, Time: 1:28 PM EDT  Chief Complaint   Patient presents with    Bradycardia     states he started feeling weak today family checked his pulse ox at home and HR was 35. Per EMS patient heartblock per EKG.  Fatigue                Sharita Cheung is a 80 y.o. male presenting to the ED for fatigue low heart rate, beginning 1 day. The complaint has been intermittent, moderate in severity, and worsened by light exertion. Patient presents via EMS for fatigue and low heart rate. States he was normal state of health yesterday when he suddenly felt very tired and fatigued. States this is while he was walking around and improved when he rested. States he felt it again today while sitting down. States he felt lightheaded but did not pass out. Has a pulse oximeter at home, reports oxygen reading was good however his heart rate was down in the 30s. Follows with Dr. Marisol Voss for coronary artery disease, denies recent chest pain shortness of breath nausea vomiting, was previously vaccinated against COVID-19. Review of Systems:   Pertinent positives and negatives are stated within HPI, all other systems reviewed and are negative.        --------------------------------------------- PAST HISTORY ---------------------------------------------  Past Medical History:  has a past medical history of BPH (benign prostatic hyperplasia), Coronary artery disease, Diverticulitis of colon, Hyperlipidemia, Hypertension, Intestinal ulcer, PUD (peptic ulcer disease), and Sleep apnea. Past Surgical History:  has a past surgical history that includes Diagnostic Cardiac Cath Lab Procedure (7/13/09); Coronary angioplasty (7/13/09); Nose surgery; and Small intestine surgery. Social History:  reports that he quit smoking about 26 years ago. His smoking use included cigarettes. He started smoking about 61 years ago. He has a 35.00 pack-year smoking history. He has never used smokeless tobacco. He reports current alcohol use. He reports that he does not use drugs. Family History: family history includes Heart Attack in his father. . Unless otherwise noted, family history is non contributory    The patients home medications have been reviewed. Allergies: Patient has no known allergies. ---------------------------------------------------PHYSICAL EXAM--------------------------------------    Constitutional/General: Alert and oriented x3 BMI 31  Head: Normocephalic and atraumatic  Eyes: PERRL, EOMI, sclera non icteric  Mouth: Oropharynx clear, handling secretions, no trismus, no asymmetry of the posterior oropharynx or uvular edema  Neck: Supple, full ROM, no stridor, no meningeal signs  Respiratory: Lungs clear to auscultation bilaterally,Not in respiratory distress  Cardiovascular: Bradycardic 2+ distal pulses. Equal extremity pulses. Chest: No chest wall tenderness  GI:  Abdomen Soft, Non tender, Non distended. No rebound, guarding, or rigidity. Musculoskeletal: Moves all extremities x 4. Warm and well perfused, +2 edema bilateral lower extremity. Capillary refill <3 seconds  Integument: skin warm and dry. No rashes. Neurologic: GCS 15, no focal deficits,   Psychiatric: Normal Affect      EKG: Interpreted by emergency department physician, Dr. Tawanda Rojo   This EKG is signed and interpreted by me. Rate: 34  Rhythm: Third-degree heart block  Interpretation: 3rd degree AV block and left axis, right bundle branch block, diffuse S change which are nonspecific. No evidence of ST elevation. QRS is 140, QTc is 431, this is change compared to prior EKG from 10/5/2020 where patient had first-degree AV block 80 QTC of 447 and QRS of 152  Comparison: changes compared to previous EKG    EKG #2 @ 1357: This EKG is signed and interpreted by me.     Rate: 32  Rhythm: SR 2nd degree block   Interpretation:    Sinus rhythm with 2nd degree AV block with 3:1 AV conduction   Right bundle branch block   Left anterior fascicular block     Bifascicular block     T wave abnormality, consider inferior ischemia   Abnormal ECG   When compared with ECG of 25-OCT-2021 13:20,   Significant changes have occurred          Comparison: changes compared to previous EKG    -------------------------------------------------- RESULTS -------------------------------------------------  I have personally reviewed all laboratory and imaging results for this patient. Results are listed below.      LABS: (Lab results interpreted by me)  Results for orders placed or performed during the hospital encounter of 10/25/21   Troponin   Result Value Ref Range    Troponin, High Sensitivity 15 (H) 0 - 11 ng/L   CBC Auto Differential   Result Value Ref Range    WBC 9.6 4.5 - 11.5 E9/L    RBC 4.76 3.80 - 5.80 E12/L    Hemoglobin 13.5 12.5 - 16.5 g/dL    Hematocrit 40.5 37.0 - 54.0 %    MCV 85.1 80.0 - 99.9 fL    MCH 28.4 26.0 - 35.0 pg    MCHC 33.3 32.0 - 34.5 %    RDW 14.4 11.5 - 15.0 fL    Platelets 171 016 - 215 E9/L    MPV 9.1 7.0 - 12.0 fL    Neutrophils % 70.5 43.0 - 80.0 %    Immature Granulocytes % 0.3 0.0 - 5.0 %    Lymphocytes % 15.8 (L) 20.0 - 42.0 %    Monocytes % 9.0 2.0 - 12.0 %    Eosinophils % 3.9 0.0 - 6.0 %    Basophils % 0.5 0.0 - 2.0 %    Neutrophils Absolute 6.73 1.80 - 7.30 E9/L    Immature Granulocytes # 0.03 E9/L    Lymphocytes Absolute 1.51 1.50 - 4.00 E9/L    Monocytes Absolute 0.86 0.10 - 0.95 E9/L    Eosinophils Absolute 0.37 0.05 - 0.50 E9/L    Basophils Absolute 0.05 0.00 - 0.20 E9/L   Comprehensive Metabolic Panel   Result Value Ref Range    Sodium 139 132 - 146 mmol/L    Potassium 3.7 3.5 - 5.0 mmol/L    Chloride 106 98 - 107 mmol/L    CO2 20 (L) 22 - 29 mmol/L    Anion Gap 13 7 - 16 mmol/L    Glucose 130 (H) 74 - 99 mg/dL    BUN 23 6 - 23 mg/dL    CREATININE 1.2 0.7 - 1.2 mg/dL    GFR Non- American 58 >=60 mL/min/1.73    GFR African American >60     Calcium 8.9 8.6 - 10.2 mg/dL    Total Protein 7.6 6.4 - 8.3 g/dL    Albumin 3.8 3.5 - 5.2 g/dL    Total Bilirubin 1.1 0.0 - 1.2 mg/dL    Alkaline Phosphatase 103 40 - 129 U/L    ALT 13 0 - 40 U/L    AST 16 0 - 39 U/L   CK   Result Value Ref Range    Total CK 47 20 - 200 U/L   Protime-INR   Result Value Ref Range    Protime 12.2 9.3 - 12.4 sec    INR 1.1    APTT   Result Value Ref Range    aPTT 29.1 24.5 - 35.1 sec   Brain Natriuretic Peptide   Result Value Ref Range    Pro- 0 - 450 pg/mL   Magnesium   Result Value Ref Range    Magnesium 1.9 1.6 - 2.6 mg/dL   EKG 12 Lead   Result Value Ref Range    Ventricular Rate 34 BPM    Atrial Rate 59 BPM    QRS Duration 140 ms    Q-T Interval 574 ms    QTc Calculation (Bazett) 431 ms    R Axis -78 degrees    T Axis -40 degrees   EKG 12 Lead   Result Value Ref Range    Ventricular Rate 32 BPM    Atrial Rate 97 BPM    QRS Duration 148 ms    Q-T Interval 582 ms    QTc Calculation (Bazett) 424 ms    P Axis 62 degrees    R Axis -66 degrees    T Axis -36 degrees   TYPE AND SCREEN   Result Value Ref Range    ABO/Rh B NEG     Antibody Screen NEG    ,       RADIOLOGY:  Interpreted by Radiologist unless otherwise specified  XR CHEST PORTABLE   Final Result   1. There is no acute cardiopulmonary disease   2. Chronic scarring seen within the left lung base.                          ------------------------- NURSING NOTES AND VITALS REVIEWED ---------------------------   The nursing notes within the ED encounter and vital signs as below have been reviewed by myself  BP (!) 82/28   Pulse (!) 30   Temp 98 °F (36.7 °C)   Resp 16   SpO2 97%     Oxygen Saturation Interpretation: Normal    The cardiac monitor revealed 3rd Degree with a heart rate in the 30-40s as interpreted by me. The cardiac monitor was ordered secondary to the patient's heart rate and to monitor the patient for dysrhythmia.    CPT [id number]    The patients available past medical records and past encounters were reviewed. ------------------------------ ED COURSE/MEDICAL DECISION MAKING----------------------  Medications   trimethobenzamide (TIGAN) injection 200 mg (has no administration in time range)   perflutren lipid microspheres (DEFINITY) injection 1.65 mg (has no administration in time range)   0.9 % sodium chloride bolus (has no administration in time range)   glucagon (rDNA) injection 2 mg (2 mg IntraVENous Given 10/25/21 1330)                    Medical Decision Making:     I, Dr. Ranjeet Lyn am the primary provider of record    Presents in third-degree heart block and fatigue. Initially hemodynamically stable, blood pressure began to trend down, EP plans to take the patient for temporary transvenous pacer. Re-Evaluations:       Time: 1400  Re-evaluation. Patients symptoms show no change  Repeat physical examination is not changed    Time: 1500  Re-evaluation. Patients symptoms show no change  Repeat physical examination is worsened -heart rate now in the 20s, having longer pauses, EP back at bedside plans to take patient for temporary pacer        This patient's ED course included: a personal history and physicial examination, multiple bedside re-evaluations, IV medications, cardiac monitoring, continuous pulse oximetry and complex medical decision making and emergency management    This patient has been closely monitored during their ED course. Consultations:  Spoke with Dr. Nat Mckoy (EP). Discussed case. They will provide consultation, will come to the ED to evaluate this patient and will take pt to lab. Spoke with Dr. Clyde Do (Medicine). Discussed case. They will admit this patient. Consult placed to Critical Care       Critical Care: Please note that the withdrawal or failure to initiate urgent interventions for this patient would likely result in a life threatening deterioration or permanent disability.       Accordingly this

## 2021-10-25 NOTE — ED NOTES
Bed: 10  Expected date:   Expected time:   Means of arrival:   Comments:     Penelope Lazaro RN  10/25/21 [id number]

## 2021-10-25 NOTE — CONSULTS
[address] [facility] and [address] [facility] Electrophysiology  Consultation Report  PATIENT: [address] [facility] RECORD NUMBER: [id number]  DATE OF SERVICE:  10/25/2021  ATTENDING ELECTROPHYSIOLOGIST: Thi Wise MD  PRIMARY ELECTROPHYSIOLOGIST: Thi Wise MD  REFERRING PHYSICIAN: No ref. provider found and Ferrell Duane, APRN - CNP  CHIEF COMPLAINT: Fatigue and weakness    HPI: This is a 80 y.o. male with a history of   Patient Active Problem List   Diagnosis    Coronary artery disease    Sleep apnea    BPH (benign prostatic hyperplasia)    Hyperlipidemia    Hypertension    Generalized abdominal pain    Obstruction of intestine    Cholelithiasis    Small bowel obstruction (HCC)    Bowel obstruction (HCC)    Shortness of breath   who presents to the hospital complaining of generalized weakness and slow heart rate. The patient has history of coronary artery disease status post PCI of RCA on 7/13/2009, chronic bifascicular block, hypertension, hyperlipidemia, obstructive sleep apnea and obesity. The patient states that he has been diagnosed with chronic bronchitis for the past 3 months. He had been treated with antibiotic in the past and is currently using only inhalers. He reports occasional lightheadedness on and off and He reports today he felt weak and when he checked his pulse using his pulse oximetry, it showed that his HR was 35 bpm so he decided to come to ED. He denies any chest pain, dyspnea, palpitation, syncope, PND or orthopnea. In ED he was noted to be in normal sinus rhythm with complete AV block with escapes in RBBB and LAFB at 35 bpm. He was given IV Glucagon. He is currently taking ToproL XL mg bid for his CAD and last dose of ToproL XL was 10/24/21 at 10.00 PM. Cardiac electrophysiology service is consulted for complete AV block.     Patient Active Problem List    Diagnosis Date Noted    Shortness of breath 08/04/2021    Small bowel obstruction ([facility] 2019    Bowel obstruction ([facility] 2019    Cholelithiasis 2016    Obstruction of intestine 2016    Generalized abdominal pain 2016    Coronary artery disease      Overview Note:     A. Cardiac catheterization (09): Nondominant right coronary artery but with a large posterolateral branch supplying a portion of the inferior wall with 70% stenosis in the mid right coronary artery. B. Stenting of mid RCA with 3.5 x 12 mm  non drug-eluting stent.        Sleep apnea     BPH (benign prostatic hyperplasia)     Hyperlipidemia     Hypertension        Past Medical History:   Diagnosis Date    BPH (benign prostatic hyperplasia)     Coronary artery disease     Diverticulitis of colon     Hyperlipidemia     Hypertension     Intestinal ulcer     PUD (peptic ulcer disease)     Sleep apnea     Possible       Family History   Problem Relation Age of Onset    Heart Attack Father        Social History     Tobacco Use    Smoking status: Former Smoker     Packs/day: 1.00     Years: 35.00     Pack years: 35.00     Types: Cigarettes     Start date:      Quit date:      Years since quittin.    Smokeless tobacco: Never Used   Substance Use Topics    Alcohol use: Yes     Comment: occasionally       Current Facility-Administered Medications   Medication Dose Route Frequency Provider Last Rate Last Admin    trimethobenzamide (TIGAN) injection 200 mg  200 mg IntraMUSCular Q6H PRN Alondra Lozada DO        perflutren lipid microspheres (DEFINITY) injection 1.65 mg  1.5 mL IntraVENous ONCE PRN SABIAN Osullivan - [place]         Current Outpatient Medications   Medication Sig Dispense Refill    tiotropium-olodaterol (STIOLTO RESPIMAT) 2.5-2.5 MCG/ACT AERS Inhale 2 puffs into the lungs daily 3 each 3    glycopyrrolate-formoterol (BEVESPI) 9-4.8 MCG/ACT AERO Inhale 2 puffs into the lungs 2 times daily 1 each 3    fluticasone (FLONASE) 50 MCG/ACT nasal spray 1 spray by Each Nostril route daily 1 Bottle 5    albuterol sulfate  (90 Base) MCG/ACT inhaler INHALE 2 PUFFS INTO THE LUNGS EVERY 4 HOURS AS NEEDED      furosemide (LASIX) 20 MG tablet Take 1 tablet by mouth daily (Patient not taking: Reported on 8/4/2021) 1 tablet 0    Azilsartan-Chlorthalidone (EDARBYCLOR) 40-12.5 MG TABS Take by mouth daily      amLODIPine (NORVASC) 10 MG tablet TAKE ONE TABLET BY MOUTH DAILY  3    Probiotic Product (PROBIOTIC-10 PO) Take 1 tablet by mouth daily      atorvastatin (LIPITOR) 20 MG tablet TAKE 1 TABLET BY MOUTH EVERY DAY  2    Cyanocobalamin (VITAMIN B-12 IJ) Inject as directed every 30 days      vitamin D (ERGOCALCIFEROL) 95416 UNITS CAPS capsule Take 5,000 Units by mouth twice a week       aspirin (ASPIRIN CHILDRENS) 81 MG chewable tablet Take 1 tablet by mouth daily. 30 tablet 3    metoprolol (TOPROL XL) 25 MG XL tablet Take 25 mg by mouth 2 times daily           No Known Allergies    ROS:   Constitutional: Negative for fever. Positive for activity change and negative for appetite change. HENT: Negative for epistaxis. Eyes: Negative for diploplia, blurred vision. Respiratory: Negative for cough, chest tightness, shortness of breath and wheezing. Cardiovascular: pertinent positives in HPI  Gastrointestinal: Negative for abdominal pain and blood in stool. All other review of systems are negative     PHYSICAL EXAM:   Vitals:    10/25/21 1400 10/25/21 1403 10/25/21 1415 10/25/21 1419   BP: (!) 116/50 (!) 125/55     Pulse: (!) 33 (!) 32  (!) 39   Resp: 18 16  20   Temp:       SpO2: 93% 92% 91% 96%      Constitutional: Well-developed, no acute distress  Eyes: conjunctivae normal, no xanthelasma   Ears, Nose, Throat: oral mucosa moist, no cyanosis   CV: no JVD. Bradycardic. Normal S1S2 and no S3. No murmurs, rubs, or gallops.  PMI is nondisplaced  Lungs: clear to auscultation bilaterally, normal respiratory effort without used of accessory muscles  Abdomen: soft, non-tender, bowel sounds present, no masses or hepatomegaly   Musculoskeletal: no digital clubbing, trace edema of LEs  Skin: warm, no rashes     I have personally reviewed the laboratory, cardiac diagnostic and radiographic testing as outlined below:    Data:    No results for input(s): WBC, HGB, HCT, PLT in the last 72 hours. No results for input(s): NA, K, CL, CO2, BUN, CREATININE, CALCIUM in the last 72 hours. Invalid input(s): GLU, MAGNESIUM   Lab Results   Component Value Date    MG 2.0 06/15/2021     No results for input(s): TSH in the last 72 hours. No results for input(s): INR in the last 72 hours. CXR 10/25./21: Pending    Telemetry 10/25/21: NSR with intermittent complete AV block  with escapes at 32-35 bpm and 3 to 1 AV block    EKG 10/25/21: Sinus rhythm with complete AV block and escapes in RBBB and LAFB at 34 bpm. Please see scan in Cardiology. Echocardiogram 6/16/21:       Left Ventricle   Normal left ventricle size and systolic function   Stage I diastolic dysfunction   No wall motion abnormalities   Ejection fraction is visually estimated at 60%. Right Ventricle   Normal right ventricle structure and function. Left Atrium   Normal sized left atrium. Right Atrium   Normal right atrium size. Mitral Valve   Structurally normal mitral valve. No mitral regurgitation. No mitral stenosis. Tricuspid Valve   Trace tricuspid regurgitation. RVSP is 25 mmHg. Aortic Valve   Trileaflet aortic valve   No hemodynamically significant aortic stenosis is present. No evidence of aortic valve regurgitation      Pulmonic Valve   Mild to moderate pulmonic regurgitation. Pericardial Effusion   No evidence of pericardial effusion. Aorta   Aortic root dimension within normal limits. Miscellaneous   The inferior vena cava diameter is normal with normal respiratory   variation.       Conclusions      Summary   Normal left ventricle size and systolic function   Stage I diastolic dysfunction      Signature      ----------------------------------------------------------------   Electronically signed by Yanira Napier DO (Interpreting   physician) on 06/17/2021 05:19 PM   ----------------------------------------------------------------    Cardiac Catheterization 7/13/09:   - Nondominant right coronary artery but with a large posterolateral branch supplying a portion of the inferior wall with 70% stenosis in the mid right coronary artery. - Stenting of mid RCA with 3.5 x 12 mm  non drug-eluting stent. Stress Test 3/7/18:   FINDINGS: The overall quality of the study was good. Left ventricular cavity size was noted to be normal.     Rotational analog analysis demonstrated no patient motion. The gated SPECT stress imaging in the short, vertical long, and   horizontal long axis demonstrated normal homogeneous tracer   distribution throughout the myocardium. A moderate defect was present in the inferior wall that was large   sized by quantification.          The resting images show no change. Gated SPECT left ventricular ejection fraction was calculated to   be 58% with normal myocardial thickening and wall motion. Impression:     1. ECG during the infusion did not change. 2. Myocardial perfusion imaging demonstrated a large sized fixed   defect in the inferior wall suggestive of a prior MI   3. Overall left ventricular systolic function was normal without   regional wall motion abnormalities. 4. Low risk general pharmacologic stress test.     Thank you for sending your patient to this [facility]. I have independently reviewed all of the ECGs and rhythm strips per above     Assessment/Plan:  This is a 80 y.o. male with a history of   Patient Active Problem List   Diagnosis    Coronary artery disease    Sleep apnea    BPH (benign prostatic hyperplasia)    Hyperlipidemia    Hypertension    placement if  hemodynamically becomes unstable. 5. Monitor on telemetry. 6. NPO after midnight. I have spent a total of 35 minutes of CCT with the patient and the family reviewing the above stated recommendations. And a total of >50% of that time involved face-to-face time providing counseling and or coordination of care with the other providers, reviewing records/tests, counseling/education of the patient, ordering medications/tests/procedures, coordinating care, and documenting clinical information in the EHR. Thank you for allowing me to participate in your patient's care. Please call me if there are any questions or concerns. Giovanni Leventhal, MD  Cardiac Electrophysiology  [facility]) Physicians  [facility] [place] [place] [facility]  2:20 PM  10/25/2021    Addendum:  The patient was found to have intermittent pauses up to 4.3 seconds. Will proceed with TVP placement while await for permanent pacemaker implantation tomorrow. Risks, benefits and alternatives were explained to the patient and his wife who verbalize understanding and agree to proceed with the procedure.       Giovanni Leventhal, MD  Cardiac Electrophysiology  [address]  [facility] [place] [place] [facility]  4:26 PM  10/25/2021

## 2021-10-26 ENCOUNTER — ANESTHESIA EVENT (OUTPATIENT)
Dept: CARDIAC CATH/INVASIVE PROCEDURES | Age: 81
DRG: 244 | End: 2021-10-26
Payer: MEDICARE

## 2021-10-26 ENCOUNTER — APPOINTMENT (OUTPATIENT)
Dept: GENERAL RADIOLOGY | Age: 81
DRG: 244 | End: 2021-10-26
Payer: MEDICARE

## 2021-10-26 ENCOUNTER — ANESTHESIA (OUTPATIENT)
Dept: CARDIAC CATH/INVASIVE PROCEDURES | Age: 81
DRG: 244 | End: 2021-10-26
Payer: MEDICARE

## 2021-10-26 ENCOUNTER — APPOINTMENT (OUTPATIENT)
Dept: CARDIAC CATH/INVASIVE PROCEDURES | Age: 81
DRG: 244 | End: 2021-10-26
Payer: MEDICARE

## 2021-10-26 VITALS — OXYGEN SATURATION: 93 % | SYSTOLIC BLOOD PRESSURE: 118 MMHG | DIASTOLIC BLOOD PRESSURE: 67 MMHG

## 2021-10-26 LAB
ALBUMIN SERPL-MCNC: 3.4 G/DL (ref 3.5–5.2)
ALP BLD-CCNC: 70 U/L (ref 40–129)
ALT SERPL-CCNC: 11 U/L (ref 0–40)
ANION GAP SERPL CALCULATED.3IONS-SCNC: 11 MMOL/L (ref 7–16)
ANION GAP SERPL CALCULATED.3IONS-SCNC: 13 MMOL/L (ref 7–16)
AST SERPL-CCNC: 15 U/L (ref 0–39)
BILIRUB SERPL-MCNC: 1.5 MG/DL (ref 0–1.2)
BUN BLDV-MCNC: 20 MG/DL (ref 6–23)
BUN BLDV-MCNC: 22 MG/DL (ref 6–23)
CALCIUM SERPL-MCNC: 8.5 MG/DL (ref 8.6–10.2)
CALCIUM SERPL-MCNC: 9 MG/DL (ref 8.6–10.2)
CHLORIDE BLD-SCNC: 106 MMOL/L (ref 98–107)
CHLORIDE BLD-SCNC: 107 MMOL/L (ref 98–107)
CO2: 19 MMOL/L (ref 22–29)
CO2: 21 MMOL/L (ref 22–29)
CREAT SERPL-MCNC: 1 MG/DL (ref 0.7–1.2)
CREAT SERPL-MCNC: 1.1 MG/DL (ref 0.7–1.2)
EKG ATRIAL RATE: 100 BPM
EKG ATRIAL RATE: 85 BPM
EKG ATRIAL RATE: 97 BPM
EKG P AXIS: 62 DEGREES
EKG Q-T INTERVAL: 514 MS
EKG Q-T INTERVAL: 574 MS
EKG Q-T INTERVAL: 582 MS
EKG QRS DURATION: 140 MS
EKG QRS DURATION: 148 MS
EKG QRS DURATION: 206 MS
EKG QTC CALCULATION (BAZETT): 424 MS
EKG QTC CALCULATION (BAZETT): 431 MS
EKG QTC CALCULATION (BAZETT): 534 MS
EKG R AXIS: -56 DEGREES
EKG R AXIS: -66 DEGREES
EKG R AXIS: -78 DEGREES
EKG T AXIS: -36 DEGREES
EKG T AXIS: -40 DEGREES
EKG T AXIS: 113 DEGREES
EKG VENTRICULAR RATE: 32 BPM
EKG VENTRICULAR RATE: 34 BPM
EKG VENTRICULAR RATE: 65 BPM
GFR AFRICAN AMERICAN: >60
GFR AFRICAN AMERICAN: >60
GFR NON-AFRICAN AMERICAN: >60 ML/MIN/1.73
GFR NON-AFRICAN AMERICAN: >60 ML/MIN/1.73
GLUCOSE BLD-MCNC: 125 MG/DL (ref 74–99)
GLUCOSE BLD-MCNC: 127 MG/DL (ref 74–99)
HCT VFR BLD CALC: 37.6 % (ref 37–54)
HCT VFR BLD CALC: 41 % (ref 37–54)
HEMOGLOBIN: 13 G/DL (ref 12.5–16.5)
HEMOGLOBIN: 14.2 G/DL (ref 12.5–16.5)
MAGNESIUM: 2 MG/DL (ref 1.6–2.6)
MCH RBC QN AUTO: 29.3 PG (ref 26–35)
MCH RBC QN AUTO: 29.5 PG (ref 26–35)
MCHC RBC AUTO-ENTMCNC: 34.6 % (ref 32–34.5)
MCHC RBC AUTO-ENTMCNC: 34.6 % (ref 32–34.5)
MCV RBC AUTO: 84.9 FL (ref 80–99.9)
MCV RBC AUTO: 85.2 FL (ref 80–99.9)
PDW BLD-RTO: 14.2 FL (ref 11.5–15)
PDW BLD-RTO: 14.2 FL (ref 11.5–15)
PLATELET # BLD: 202 E9/L (ref 130–450)
PLATELET # BLD: 214 E9/L (ref 130–450)
PMV BLD AUTO: 9 FL (ref 7–12)
PMV BLD AUTO: 9.1 FL (ref 7–12)
POTASSIUM SERPL-SCNC: 3.8 MMOL/L (ref 3.5–5)
POTASSIUM SERPL-SCNC: 4 MMOL/L (ref 3.5–5)
RBC # BLD: 4.43 E12/L (ref 3.8–5.8)
RBC # BLD: 4.81 E12/L (ref 3.8–5.8)
SODIUM BLD-SCNC: 138 MMOL/L (ref 132–146)
SODIUM BLD-SCNC: 139 MMOL/L (ref 132–146)
TOTAL PROTEIN: 6.2 G/DL (ref 6.4–8.3)
WBC # BLD: 10.7 E9/L (ref 4.5–11.5)
WBC # BLD: 12.1 E9/L (ref 4.5–11.5)

## 2021-10-26 PROCEDURE — 6370000000 HC RX 637 (ALT 250 FOR IP): Performed by: INTERNAL MEDICINE

## 2021-10-26 PROCEDURE — 2500000003 HC RX 250 WO HCPCS

## 2021-10-26 PROCEDURE — 6360000002 HC RX W HCPCS: Performed by: INTERNAL MEDICINE

## 2021-10-26 PROCEDURE — 6360000002 HC RX W HCPCS

## 2021-10-26 PROCEDURE — 33208 INSRT HEART PM ATRIAL & VENT: CPT

## 2021-10-26 PROCEDURE — 2580000003 HC RX 258

## 2021-10-26 PROCEDURE — 2580000003 HC RX 258: Performed by: INTERNAL MEDICINE

## 2021-10-26 PROCEDURE — 93308 TTE F-UP OR LMTD: CPT

## 2021-10-26 PROCEDURE — C1894 INTRO/SHEATH, NON-LASER: HCPCS

## 2021-10-26 PROCEDURE — 2500000003 HC RX 250 WO HCPCS: Performed by: INTERNAL MEDICINE

## 2021-10-26 PROCEDURE — 0JH606Z INSERTION OF PACEMAKER, DUAL CHAMBER INTO CHEST SUBCUTANEOUS TISSUE AND FASCIA, OPEN APPROACH: ICD-10-PCS | Performed by: STUDENT IN AN ORGANIZED HEALTH CARE EDUCATION/TRAINING PROGRAM

## 2021-10-26 PROCEDURE — 33208 INSRT HEART PM ATRIAL & VENT: CPT | Performed by: STUDENT IN AN ORGANIZED HEALTH CARE EDUCATION/TRAINING PROGRAM

## 2021-10-26 PROCEDURE — 80048 BASIC METABOLIC PNL TOTAL CA: CPT

## 2021-10-26 PROCEDURE — C1898 LEAD, PMKR, OTHER THAN TRANS: HCPCS

## 2021-10-26 PROCEDURE — 85027 COMPLETE CBC AUTOMATED: CPT

## 2021-10-26 PROCEDURE — 3700000000 HC ANESTHESIA ATTENDED CARE

## 2021-10-26 PROCEDURE — 2580000003 HC RX 258: Performed by: STUDENT IN AN ORGANIZED HEALTH CARE EDUCATION/TRAINING PROGRAM

## 2021-10-26 PROCEDURE — 2140000000 HC CCU INTERMEDIATE R&B

## 2021-10-26 PROCEDURE — 2700000000 HC OXYGEN THERAPY PER DAY

## 2021-10-26 PROCEDURE — 6360000004 HC RX CONTRAST MEDICATION: Performed by: INTERNAL MEDICINE

## 2021-10-26 PROCEDURE — 3700000001 HC ADD 15 MINUTES (ANESTHESIA)

## 2021-10-26 PROCEDURE — 2709999900 HC NON-CHARGEABLE SUPPLY

## 2021-10-26 PROCEDURE — 36415 COLL VENOUS BLD VENIPUNCTURE: CPT

## 2021-10-26 PROCEDURE — 93010 ELECTROCARDIOGRAM REPORT: CPT | Performed by: INTERNAL MEDICINE

## 2021-10-26 PROCEDURE — 99231 SBSQ HOSP IP/OBS SF/LOW 25: CPT | Performed by: INTERNAL MEDICINE

## 2021-10-26 PROCEDURE — 80053 COMPREHEN METABOLIC PANEL: CPT

## 2021-10-26 PROCEDURE — 02HK3JZ INSERTION OF PACEMAKER LEAD INTO RIGHT VENTRICLE, PERCUTANEOUS APPROACH: ICD-10-PCS | Performed by: STUDENT IN AN ORGANIZED HEALTH CARE EDUCATION/TRAINING PROGRAM

## 2021-10-26 PROCEDURE — 83735 ASSAY OF MAGNESIUM: CPT

## 2021-10-26 PROCEDURE — 71045 X-RAY EXAM CHEST 1 VIEW: CPT

## 2021-10-26 PROCEDURE — 94640 AIRWAY INHALATION TREATMENT: CPT

## 2021-10-26 PROCEDURE — 02H63JZ INSERTION OF PACEMAKER LEAD INTO RIGHT ATRIUM, PERCUTANEOUS APPROACH: ICD-10-PCS | Performed by: STUDENT IN AN ORGANIZED HEALTH CARE EDUCATION/TRAINING PROGRAM

## 2021-10-26 PROCEDURE — 93005 ELECTROCARDIOGRAM TRACING: CPT | Performed by: INTERNAL MEDICINE

## 2021-10-26 PROCEDURE — C1785 PMKR, DUAL, RATE-RESP: HCPCS

## 2021-10-26 PROCEDURE — 99291 CRITICAL CARE FIRST HOUR: CPT | Performed by: INTERNAL MEDICINE

## 2021-10-26 RX ORDER — FENTANYL CITRATE 50 UG/ML
INJECTION, SOLUTION INTRAMUSCULAR; INTRAVENOUS PRN
Status: DISCONTINUED | OUTPATIENT
Start: 2021-10-26 | End: 2021-10-26 | Stop reason: SDUPTHER

## 2021-10-26 RX ORDER — SODIUM CHLORIDE 9 MG/ML
INJECTION, SOLUTION INTRAVENOUS CONTINUOUS PRN
Status: DISCONTINUED | OUTPATIENT
Start: 2021-10-26 | End: 2021-10-26 | Stop reason: SDUPTHER

## 2021-10-26 RX ORDER — VANCOMYCIN HYDROCHLORIDE 1 G/20ML
INJECTION, POWDER, LYOPHILIZED, FOR SOLUTION INTRAVENOUS PRN
Status: DISCONTINUED | OUTPATIENT
Start: 2021-10-26 | End: 2021-10-26 | Stop reason: SDUPTHER

## 2021-10-26 RX ORDER — SODIUM CHLORIDE 9 MG/ML
25 INJECTION, SOLUTION INTRAVENOUS PRN
Status: DISCONTINUED | OUTPATIENT
Start: 2021-10-26 | End: 2021-10-27 | Stop reason: HOSPADM

## 2021-10-26 RX ORDER — PROPOFOL 10 MG/ML
INJECTION, EMULSION INTRAVENOUS CONTINUOUS PRN
Status: DISCONTINUED | OUTPATIENT
Start: 2021-10-26 | End: 2021-10-26 | Stop reason: SDUPTHER

## 2021-10-26 RX ORDER — SODIUM CHLORIDE 0.9 % (FLUSH) 0.9 %
5-40 SYRINGE (ML) INJECTION PRN
Status: DISCONTINUED | OUTPATIENT
Start: 2021-10-26 | End: 2021-10-27 | Stop reason: HOSPADM

## 2021-10-26 RX ORDER — SODIUM CHLORIDE 0.9 % (FLUSH) 0.9 %
5-40 SYRINGE (ML) INJECTION EVERY 12 HOURS SCHEDULED
Status: DISCONTINUED | OUTPATIENT
Start: 2021-10-26 | End: 2021-10-27 | Stop reason: HOSPADM

## 2021-10-26 RX ORDER — METOPROLOL SUCCINATE 25 MG/1
25 TABLET, EXTENDED RELEASE ORAL 2 TIMES DAILY
Status: DISCONTINUED | OUTPATIENT
Start: 2021-10-27 | End: 2021-10-27 | Stop reason: HOSPADM

## 2021-10-26 RX ORDER — DEXTROSE MONOHYDRATE 50 MG/ML
INJECTION, SOLUTION INTRAVENOUS CONTINUOUS PRN
Status: DISCONTINUED | OUTPATIENT
Start: 2021-10-26 | End: 2021-10-26 | Stop reason: SDUPTHER

## 2021-10-26 RX ADMIN — ONDANSETRON 4 MG: 2 INJECTION INTRAMUSCULAR; INTRAVENOUS at 07:00

## 2021-10-26 RX ADMIN — SODIUM CHLORIDE, PRESERVATIVE FREE 10 ML: 5 INJECTION INTRAVENOUS at 20:22

## 2021-10-26 RX ADMIN — SODIUM CHLORIDE: 9 INJECTION, SOLUTION INTRAVENOUS at 12:12

## 2021-10-26 RX ADMIN — IPRATROPIUM BROMIDE 0.5 MG: 0.5 SOLUTION RESPIRATORY (INHALATION) at 19:39

## 2021-10-26 RX ADMIN — FAMOTIDINE 20 MG: 10 INJECTION INTRAVENOUS at 10:20

## 2021-10-26 RX ADMIN — ARFORMOTEROL TARTRATE 15 MCG: 15 SOLUTION RESPIRATORY (INHALATION) at 09:13

## 2021-10-26 RX ADMIN — Medication 10 ML: at 08:23

## 2021-10-26 RX ADMIN — DEXTROSE MONOHYDRATE: 50 INJECTION, SOLUTION INTRAVENOUS at 12:31

## 2021-10-26 RX ADMIN — IPRATROPIUM BROMIDE 0.5 MG: 0.5 SOLUTION RESPIRATORY (INHALATION) at 09:13

## 2021-10-26 RX ADMIN — DOCUSATE SODIUM 100 MG: 100 CAPSULE, LIQUID FILLED ORAL at 20:21

## 2021-10-26 RX ADMIN — VANCOMYCIN HYDROCHLORIDE 1000 MG: 1 INJECTION, POWDER, LYOPHILIZED, FOR SOLUTION INTRAVENOUS at 12:31

## 2021-10-26 RX ADMIN — FENTANYL CITRATE 25 MCG: 50 INJECTION, SOLUTION INTRAMUSCULAR; INTRAVENOUS at 12:22

## 2021-10-26 RX ADMIN — ATORVASTATIN CALCIUM 20 MG: 20 TABLET, FILM COATED ORAL at 08:33

## 2021-10-26 RX ADMIN — FENTANYL CITRATE 25 MCG: 50 INJECTION, SOLUTION INTRAMUSCULAR; INTRAVENOUS at 12:53

## 2021-10-26 RX ADMIN — PERFLUTREN 1.65 MG: 6.52 INJECTION, SUSPENSION INTRAVENOUS at 08:23

## 2021-10-26 RX ADMIN — IPRATROPIUM BROMIDE 0.5 MG: 0.5 SOLUTION RESPIRATORY (INHALATION) at 15:50

## 2021-10-26 RX ADMIN — ARFORMOTEROL TARTRATE 15 MCG: 15 SOLUTION RESPIRATORY (INHALATION) at 19:39

## 2021-10-26 RX ADMIN — Medication 10 ML: at 20:21

## 2021-10-26 RX ADMIN — SODIUM CHLORIDE: 9 INJECTION, SOLUTION INTRAVENOUS at 12:16

## 2021-10-26 RX ADMIN — ASPIRIN 81 MG CHEWABLE TABLET 81 MG: 81 TABLET CHEWABLE at 08:33

## 2021-10-26 RX ADMIN — PROPOFOL 30 MCG/KG/MIN: 10 INJECTION, EMULSION INTRAVENOUS at 12:22

## 2021-10-26 ASSESSMENT — PULMONARY FUNCTION TESTS
PIF_VALUE: 15
PIF_VALUE: 14
PIF_VALUE: 0
PIF_VALUE: 14
PIF_VALUE: 15
PIF_VALUE: 0
PIF_VALUE: 15
PIF_VALUE: 0
PIF_VALUE: 15
PIF_VALUE: 0
PIF_VALUE: 15
PIF_VALUE: 0
PIF_VALUE: 15
PIF_VALUE: 13
PIF_VALUE: 0
PIF_VALUE: 15
PIF_VALUE: 14
PIF_VALUE: 14
PIF_VALUE: 1
PIF_VALUE: 15
PIF_VALUE: 14
PIF_VALUE: 15
PIF_VALUE: 0
PIF_VALUE: 15
PIF_VALUE: 15
PIF_VALUE: 14
PIF_VALUE: 15
PIF_VALUE: 14
PIF_VALUE: 15
PIF_VALUE: 14
PIF_VALUE: 0
PIF_VALUE: 15
PIF_VALUE: 14
PIF_VALUE: 0
PIF_VALUE: 0
PIF_VALUE: 15
PIF_VALUE: 0
PIF_VALUE: 15
PIF_VALUE: 0
PIF_VALUE: 14
PIF_VALUE: 15
PIF_VALUE: 0
PIF_VALUE: 14
PIF_VALUE: 15
PIF_VALUE: 14
PIF_VALUE: 0
PIF_VALUE: 13
PIF_VALUE: 15
PIF_VALUE: 0
PIF_VALUE: 14
PIF_VALUE: 14
PIF_VALUE: 15
PIF_VALUE: 0
PIF_VALUE: 15
PIF_VALUE: 14
PIF_VALUE: 0
PIF_VALUE: 15
PIF_VALUE: 14
PIF_VALUE: 15
PIF_VALUE: 0
PIF_VALUE: 17
PIF_VALUE: 17
PIF_VALUE: 15
PIF_VALUE: 0
PIF_VALUE: 15
PIF_VALUE: 0
PIF_VALUE: 15
PIF_VALUE: 14
PIF_VALUE: 15
PIF_VALUE: 15
PIF_VALUE: 0
PIF_VALUE: 0
PIF_VALUE: 22
PIF_VALUE: 23
PIF_VALUE: 14
PIF_VALUE: 15
PIF_VALUE: 15

## 2021-10-26 ASSESSMENT — ENCOUNTER SYMPTOMS: SHORTNESS OF BREATH: 1

## 2021-10-26 ASSESSMENT — PAIN SCALES - GENERAL
PAINLEVEL_OUTOF10: 0

## 2021-10-26 ASSESSMENT — LIFESTYLE VARIABLES: SMOKING_STATUS: 0

## 2021-10-26 NOTE — PROGRESS NOTES
OCCUPATIONAL THERAPY    Date:10/26/2021  Patient Name: Marian Loera  MRN: [id number]  : 1940  Room: [address] [place] [address]              Chart reviewed. Pt on bedrest (TVP.)  Will re-attempt at later time. Thank you for consult.     Lanny Henry, OTR/L [id number]

## 2021-10-26 NOTE — PROGRESS NOTES
Physical Therapy  Physical Therapy Attempt    Name: Ajay Eugene  : 1940  MRN: [id number]      Date of Service: 10/26/2021  Chart reviewed. Pt currently has TVP in place and is on bedrest at this time. Will re-attempt as able.     Hilary Hudson, PT, DPT  [id number]

## 2021-10-26 NOTE — CONSULTS
SURGICAL INTENSIVE CARE  PROGRESS NOTE    Date of admission:  10/25/2021  Reason for ICU transfer:  Complete heart block    HOSPITAL COURSE:  10/25/21  - patient arrived via EMS for evaluation of bradycardia. He states for the last few days he has been feeling very weak with some intermittent dizziness. His daughter then checked his pulse oximetry and heart rate at home. Rate was found to be 32 at which point EMS was called. He has a history of CAD with stent placement 10 years ago but no other heart history. He received a temporary pacemaker per EP and is doing well post operatively. PHYSICAL EXAM:  /76   Pulse 64   Temp 97.6 °F (36.4 °C) (Oral)   Resp 25   Ht 6' 2\" (1.88 m)   Wt 250 lb (113.4 kg)   SpO2 95%   BMI 32.10 kg/m²     GENERAL:  NAD. A&Ox3. HEAD:  Normocephalic, atraumatic. EYES:   No scleral icterus. PERRLA. LUNGS:  No increased work of breathing. CTAB. CARDIOVASCULAR: RR  ABDOMEN:  Soft, non-distended, non-tender. No guarding, rigidity, rebound. EXTREMITIES:   MAEx4. Atraumatic. No LE edema. SKIN:  Warm and dry  NEUROLOGIC:  GCS 15    ASSESSMENT / PLAN:  Neuro:  No acute issues. GCS 15. Pain control prn  CV: has been stable. Continuous cardiac monitoring in place  Pulm: history of recent bronchitis. Continue brovana, atrovent and albuterol. Encourage IS/SMI. GI: NPO after midnight for procedure. Bowel regimen. Zofran PRN. Renal: No acute issues. Monitor UOP & Electrolytes. Endocrine:  Monitor glucose to keep <180 in ICU  MSK: No acute issues. PT/OT.   Heme: daily labs   ID: No indication for antibiotics     Pain/Analgesia: tylenol  Bowel regimen: colace  DVT proph:  none  GI proph:  none   Glucose protocol: N/A    Mouth/eye care: As needed per patient  Stanley:   none  CVC sites:  none    Ancillary consults: Cardiology, EP  Family Update: As available    Disposition: Continue ICU care    Ashley Bean MD  10/25/2021  10:16 PM

## 2021-10-26 NOTE — OP NOTE
Operative Note      Patient: Yumiko Nolan  YOB: 1940  MRN: [id number]    Date of Procedure: 10/26/2021    Pre-Op Diagnosis: 3* AV Block    Post-Op Diagnosis: Same       Procedure: Medtronic dual chamber pacemaker implant     Surgeon: Cheryl Ritter DO     Assistant: None     Anesthesia: see separate Anesthesia report     Estimated Blood Loss (mL): 10 mL     Complications: none     Detailed Description of Procedure:   Verbal and written informed consent obtained from the patient and family. The patient was brought to the EP lab in a fasting state. Monitored anesthesia care was administered by anesthesia provider during the procedure. The left upper chest was inspected for hair at the anticipated incision site within the clavipectoral triangle and if present was removed with electric clippers. The site was then prepared with chlorhexidine gluconate and draped in a sterile fashion. Vancomycin was administered paraenterally within 1.5 hours prior to incision. The left upper chest area was inspected for main folding lines and striae to guide orientation of incision and if not apparent a pinch test in various directions was performed to visualize the folding lines. The incision was planned to follow perpendicular to the main folding lines in order to minimize scar formation and optimize wound healing. Local anesthesia with 2% lidocaine HCl was applied to the planned incision and pocket area. Once adequate sedation was achieved and lidocaine was administered, an incision was made two finger-breadths inferior the left clavicle between the mid-clavicular line and deltopectoral groove, which was of adequate width to accommodate the device. Using blunt dissection and electrocautery, a subcutaneous device pocket was created superficial to the pectoralis major muscle fascia in order to avoid the pain corpuscles of the subcuticular plane and vascular pectoralis major muscle.  The pocket was extended in a medial and inferior direction from the pocket incision site. A superior lip to the pocket was also created. Hemostasis was achieved and confirmed. A venogram with IV contrast was performed in order to confirm patency and location of left axillary, subclavian veins and SVC, as well as guide access in the axillary vein overlying the first rib with a modified Seldinger technique and micro puncture needle under fluoroscopic visualization in AP 30* view in order to reduce the risk of pneumothorax and hemothorax. Two J tip wires were advanced to the right atrium (RA) and IVC in order to confirm venous puncture and avoid inadvertent placement of pacing leads in the arterial system. A 7 Greek sheath was advanced over the the first one. Wire and dilator were removed. A 58 cm 5.7 F SovicellureFix Novus MRI ScureScan (model: [id number]) active fixation lead with a soft straight stylet was advanced into the sheath, then the stylet was partially retracted and the lead was advanced into the RA via the sheath. The stylet was removed and a curve was introduced to the distal end of the stylet. The stylet was the reintroduced to the lead. A soft stylet was used to minimize the risk of cardiac perforation and damage the tricuspid valve (TV). The lead was advanced into the right ventricle (RV) at a lower plane in order to avoid complications with the TV due to the chordae on the septal aspect. The lead was advanced into the pulmonary artery while partially retracting the stylet in order to confirm location in the RV and avoid inadvertent placement of the lead in the middle cardiac vein or in the LV through a patent foramen ovale. The stylet was exchanged for wide soft Mond stylet, which was used to direct the lead tip to the RV septum. The lead was then slowly withdrawn until it dropped into the RV cavity and parallel with mid septum in right anterior oblique (MEADE) fluoroscopic view.  The lead was positioned on the mid RV septum as this phrenic nerve capture was observed. The sheath was split, and the lead was sutured to the pectoralis major muscle with a non-absorbable 0 silk suture over suture sleeve. The pocket was then irrigated with antibiotic infused saline in order to remove tissue debris and uncover any persistent bleeding. Hemostasis was again confirmed. The leads were connected to the [employer] Gerber XT DR MRI SureScan (model: [id number]) device. The leads were curled in the pocket in fashion to avoid any acute angles. The device was then placed in the pocket with the leads beneath the device. SURGIFLO hemostatic matrix with thrombin was injected into the pocket above and below the device. Fluoroscopy was used to confirm ideal device and lead position in the pocket, lead connector pins in the device header, adequate lead slack, and lead position. The device was secured in place over the leads with a non-absorbable 0 silk suture. The pocket was closed with multiple layers of suture. An absorbable 2-0 Vicryl violet braided suture with CT-1 needle was used to approximate the clavipectoral deep fascial layer with running suture technique with deep bites of equal spacing in order to isolate the pocket, restore anterior wall structure, and prevent device erosion. An absorbable 2-0 Vicryl violet braided suture with CT-1 needle was used to approximate the subcutaneous tissue with running suture technique with deep bites of equal spacing. An absorbable 4-0 Monocryl suture with reverse cutting needle was used to approximate the subcuticular tissue with minimal tension through running suture technique with closely spaced bites and buried sutured knot at the ends. Dermabond was placed over the incision. An Aquacel Ag surgical dressing was placed over the incision. A pressure dressing was placed over the incision.  The device was programmed DDDR 60/120 ppm with AV delays of 150 msec (sense) and 180 msec (pace) with RA lead sensitivity of 0.3 mV and output of 3.5 V @ 0.4 msec, and RV lead sensitivity of 0.9 mV and output of 3.5 V @ 0.4 msec. Final lead assessment revealed:  -RA: sensing = 2.6 mV, impedance = 475 ohms, threshold = 1.0 V @ 0.4 msec  -RV: sensing = N/A (dependent), impedance = 551 ohms, threshold = 0.5 V @ 0.4 msec    Right femoral vein access sheath removed and hemostasis achieved with manual pressure. SUMMARY: Successful Medtronic dual chamber pacemaker implant in the setting of 3* AV block. Removal of temporary transvenous pacemaker from right femoral vein access site.     Electronically signed by Cheryl Ritter DO on 10/26/2021 at 1:54 PM

## 2021-10-26 NOTE — ANESTHESIA POSTPROCEDURE EVALUATION
Department of Anesthesiology  Postprocedure Note    Patient: Tia Barajas  MRN: [id number]  YOB: 1940  Date of evaluation: 10/26/2021  Time:  4:23 PM     Procedure Summary     Date: 10/26/21 [place] [facility] [place] [facility]    Anesthesia Start: 5154 Anesthesia Stop: 6459    Procedure: PACEMAKER IMPLANT W/ANES Diagnosis:     Scheduled Providers: SABINA Calles - CRNA Responsible Provider: Paloma Nunez MD    Anesthesia Type: MAC ASA Status: 3          Anesthesia Type: MAC    Whitney Phase I:      Whitney Phase II:      Last vitals: Reviewed and per EMR flowsheets.        Anesthesia Post Evaluation    Patient location during evaluation: PACU  Patient participation: complete - patient participated  Level of consciousness: awake and alert  Airway patency: patent  Nausea & Vomiting: no nausea and no vomiting  Complications: no  Cardiovascular status: hemodynamically stable and blood pressure returned to baseline  Respiratory status: acceptable  Hydration status: euvolemic

## 2021-10-26 NOTE — PROGRESS NOTES
patient is seen in the hospital for follow up. He denies any chest pain, dyspnea or palpitations. He remians in complete AV block with V paced at 60 bpm with PVCs. There were evidence of lost of capture noted intermittently at 1.00 AM and 7.00 AM suspected from positioning of the patient. Current Threshold was 0.5 mA and the output is setting at 5 mA. Patient Active Problem List    Diagnosis Date Noted    Third degree heart block ([address] 10/25/2021    Complete AV block ([address]     Shortness of breath 2021    Small bowel obstruction ([address] 2019    Bowel obstruction ([address] 2019    Cholelithiasis 2016    Obstruction of intestine 2016    Generalized abdominal pain 2016    Coronary artery disease      Overview Note:     Cardiac catheterization (09): Nondominant right coronary artery but with a large posterolateral branch supplying a portion of the inferior wall with 70% stenosis in the mid right coronary artery. Stenting of mid RCA with 3.5 x 12 mm  non drug-eluting stent.        Sleep apnea     BPH (benign prostatic hyperplasia)     Hyperlipidemia     Hypertension        Past Medical History:   Diagnosis Date    BPH (benign prostatic hyperplasia)     Bradycardia 10/25/2021    Emergent TVP placed    Coronary artery disease     Diverticulitis of colon     Hyperlipidemia     Hypertension     Intestinal ulcer     PUD (peptic ulcer disease)     Sleep apnea     Possible       Family History   Problem Relation Age of Onset    Heart Attack Father        Social History     Tobacco Use    Smoking status: Former Smoker     Packs/day: 1.00     Years: 35.00     Pack years: 35.00     Types: Cigarettes     Start date: 56     Quit date:      Years since quittin.8    Smokeless tobacco: Never Used   Substance Use Topics    Alcohol use: Yes     Comment: occasionally       Current Facility-Administered Medications   Medication Dose Route Frequency Provider Last Rate Last Admin    trimethobenzamide (TIGAN) injection 200 mg  200 mg IntraMUSCular Q6H PRN Negin Lawotn MD        perflutren lipid microspheres (DEFINITY) injection 1.65 mg  1.5 mL IntraVENous ONCE PRN Negin Lawton MD        0.9 % sodium chloride bolus  500 mL IntraVENous Once Samantha Romero MD        sodium chloride flush 0.9 % injection 5-40 mL  5-40 mL IntraVENous 2 times per day Samantha Romero MD   5 mL at 10/25/21 2218    sodium chloride flush 0.9 % injection 5-40 mL  5-40 mL IntraVENous PRN Samantha Romero MD        0.9 % sodium chloride infusion  25 mL IntraVENous PRN Samantha Romero MD        aspirin chewable tablet 81 mg  81 mg Oral Daily Nicholas Ross Specking, DO   81 mg at 10/25/21 2130    atorvastatin (LIPITOR) tablet 20 mg  20 mg Oral Daily Nicholas Ross Specking, DO        docusate sodium (COLACE) capsule 100 mg  100 mg Oral Nightly Nicholas Ross Specking, DO   100 mg at 10/25/21 2218    ondansetron (ZOFRAN) injection 4 mg  4 mg IntraVENous Q6H PRN Flori Karin, DO   4 mg at 10/26/21 0700    acetaminophen (TYLENOL) tablet 650 mg  650 mg Oral Q4H PRN Nichloas Ross Specking, DO        albuterol (PROVENTIL) nebulizer solution 2.5 mg  2.5 mg Nebulization Q4H PRN Flori Karin, DO        ipratropium (ATROVENT) 0.02 % nebulizer solution 0.5 mg  0.5 mg Nebulization 4x daily Nicholas Ross Specking, DO   0.5 mg at 10/25/21 2130    Arformoterol Tartrate (BROVANA) nebulizer solution 15 mcg  15 mcg Nebulization BID Flori Karin, DO   15 mcg at 10/25/21 2129        No Known Allergies    ROS:   Constitutional: Negative for fever. Positive for activity change and negative for appetite change. HENT: Negative for epistaxis. Eyes: Negative for diploplia, blurred vision. Respiratory: Negative for cough, chest tightness, shortness of breath and wheezing.    Cardiovascular: pertinent positives in HPI  Gastrointestinal: Negative for abdominal pain and blood in stool. All other review of systems are negative     PHYSICAL EXAM:   Vitals:    10/26/21 0400 10/26/21 0500 10/26/21 0600 10/26/21 0700   BP: (!) 126/56 110/64 112/66    Pulse: 61 60 61 69   Resp: 22 20 21 14   Temp:   98 °F (36.7 °C)    TempSrc:   Oral    SpO2: 92% 92% 94% 93%   Weight:       Height:          Constitutional: Well-developed, no acute distress  Eyes: conjunctivae normal, no xanthelasma   Ears, Nose, Throat: oral mucosa moist, no cyanosis   CV: no JVD. Regular rate. No murmurs, rubs, or gallops. PMI is nondisplaced  Lungs: clear to auscultation bilaterally, normal respiratory effort without used of accessory muscles  Abdomen: soft, non-tender, bowel sounds present, no masses or hepatomegaly   Musculoskeletal: no digital clubbing, trace edema of LEs  Skin: warm, no rashes     I have personally reviewed the laboratory, cardiac diagnostic and radiographic testing as outlined below:    Data:    Recent Labs     10/25/21  1334 10/26/21  0500   WBC 9.6 10.7   HGB 13.5 13.0   HCT 40.5 37.6    202     Recent Labs     10/25/21  1334 10/26/21  0500    139   K 3.7 3.8    107   CO2 20* 21*   BUN 23 22   CREATININE 1.2 1.1   CALCIUM 8.9 8.5*      Lab Results   Component Value Date    MG 1.9 10/25/2021     Recent Labs     10/25/21  1334   TSH 2.720     Recent Labs     10/25/21  1334   INR 1.1       CXR 10/25./21:   FINDINGS:   A single portable AP view of the chest was obtained.       There are multiple leads overlying the thorax. Heri Roche is a solitary lead   projecting over the heart which appears to enter via the inferior vena cava. Heart, mediastinum, and pulmonary vasculature are within normal limits.    There are stable opacities the lateral left lung base that may represent   atelectasis and/or scarring.  Lungs and pleural spaces are otherwise clear.           Impression   Solitary cardiac lead enters via the inferior vena cava.  There is stable   atelectasis and/or scarring left lung base with no evidence of pneumothorax. Telemetry 10/26/21: NSR with V paced with underlying complete AV block  with no ventricular escapes. Intermittent loss of capture noted at 1.00 and 7.00 AM suspected positioning. EKG 10/25/21: Sinus rhythm with complete AV block and escapes in RBBB and LAFB at 34 bpm. Please see scan in Cardiology. Echocardiogram 6/16/21:       Left Ventricle   Normal left ventricle size and systolic function   Stage I diastolic dysfunction   No wall motion abnormalities   Ejection fraction is visually estimated at 60%. Right Ventricle   Normal right ventricle structure and function. Left Atrium   Normal sized left atrium. Right Atrium   Normal right atrium size. Mitral Valve   Structurally normal mitral valve. No mitral regurgitation. No mitral stenosis. Tricuspid Valve   Trace tricuspid regurgitation. RVSP is 25 mmHg. Aortic Valve   Trileaflet aortic valve   No hemodynamically significant aortic stenosis is present. No evidence of aortic valve regurgitation      Pulmonic Valve   Mild to moderate pulmonic regurgitation. Pericardial Effusion   No evidence of pericardial effusion. Aorta   Aortic root dimension within normal limits. Miscellaneous   The inferior vena cava diameter is normal with normal respiratory   variation. Conclusions      Summary   Normal left ventricle size and systolic function   Stage I diastolic dysfunction      Signature      ----------------------------------------------------------------   Electronically signed by Douglas Amaro DO (Interpreting   physician) on 06/17/2021 05:19 PM   ----------------------------------------------------------------    Cardiac Catheterization 7/13/09:   - Nondominant right coronary artery but with a large posterolateral branch supplying a portion of the inferior wall with 70% stenosis in the mid right coronary artery.    - Stenting of mid RCA with 3.5 x 12 mm  non drug-eluting stent. Stress Test 3/7/18:   FINDINGS: The overall quality of the study was good. Left ventricular cavity size was noted to be normal.     Rotational analog analysis demonstrated no patient motion. The gated SPECT stress imaging in the short, vertical long, and   horizontal long axis demonstrated normal homogeneous tracer   distribution throughout the myocardium. A moderate defect was present in the inferior wall that was large   sized by quantification.          The resting images show no change. Gated SPECT left ventricular ejection fraction was calculated to   be 58% with normal myocardial thickening and wall motion. Impression:     1. ECG during the infusion did not change. 2. Myocardial perfusion imaging demonstrated a large sized fixed   defect in the inferior wall suggestive of a prior MI   3. Overall left ventricular systolic function was normal without   regional wall motion abnormalities. 4. Low risk general pharmacologic stress test.     Thank you for sending your patient to this [facility]. I have independently reviewed all of the ECGs and rhythm strips per above     Assessment/Plan: This is a 80 y.o. male with a history of   Patient Active Problem List   Diagnosis    Coronary artery disease    Sleep apnea    BPH (benign prostatic hyperplasia)    Hyperlipidemia    Hypertension    Generalized abdominal pain    Obstruction of intestine    Cholelithiasis    Small bowel obstruction (HCC)    Bowel obstruction (HCC)    Shortness of breath    Complete AV block ([address]    Third degree heart block ([address]    who presents with complete AV block. 1. Complete AV block  - Underlying RBBB and LAFB since 1/3/2012 per EKG. - On Toprol XL 25 mg bid and last dose was 10/24/21 at 10.00 PM.  - Thus far no reversible cause is identified.  - Status post TVP placement 10/25/21 due to intermittent pauses up to 4.3 seconds.   - Remains in NSR with complete AV block. - Class I indication for pacemaker implantation.   - Risks, benefits, and alternatives of permanent pacemaker implantation were discussed in detail today. These risks include but are not limited to bleeding, infection, blood clot, pneumothorax, hemothorax,cardiac valve damage, cardiac perforation and tamponade required emergent thoracotomy, contrast induced nephropathy leading to short or even long term dialysis, vascular injury requiring emergent surgical repair, lead dislodgement, stroke and even death. The patient understands these risks and agrees to proceed with pacemaker implantation. 2. Chronic bifascicular block  - Diagnosed with RBBB and LAFB since 1/3/2012. 3. Coronary artery disease   - [facility] 7/13/2009 showed nondominant right coronary artery but with a large posterolateral branch supplying a portion of the inferior wall with 70% stenosis in the mid right coronary artery. - Status post PCI of RCA on 7/13/2009. - On ASA, Toprol XL and Lipitor at home. - Toprol XL is currently on hold. 4. Hypertension  - Well controlled. - On Edarbyclor, Toprol XL and Norvasc at home. - Currently not on any meds. 5. Hyperlipidemia  - On Lipitor    6. Obstructive sleep apnea     7. Obesity  -Body mass index is 32.1 kg/m². 8. Chronic bronchitis   - On Bevespi and Albuterol at home. - Currently on Brovana and Atrovent bebulizers. Recommendations:  1. Will increase pacing rate to 80 bpm to suppress PVCs. 2. Absolute bedrest and laying down on his back only to prevent loss of capture due to positioning. 3. Proceed  with dual chamber permanent pacemaker implantation and TVP removal today. 4. Await for limited echocardiogram result. 5. Hold Toprol XL and restart after the procedure. I have spent a total of 35 minutes of CCT with the patient and the family reviewing the above stated recommendations.   And a total of >50% of that time involved face-to-face time providing

## 2021-10-26 NOTE — ANESTHESIA PRE PROCEDURE
Dose Route Frequency Provider Last Rate Last Admin    [START ON 10/27/2021] metoprolol succinate (TOPROL XL) extended release tablet 25 mg  25 mg Oral BID Tedra Micheal, DO        famotidine (PEPCID) injection 20 mg  20 mg IntraVENous Daily Francisco Yeager MD   20 mg at 10/26/21 1020    trimethobenzamide (TIGAN) injection 200 mg  200 mg IntraMUSCular Q6H PRN Rojas Mario MD        0.9 % sodium chloride bolus  500 mL IntraVENous Once Rojas Mario MD        sodium chloride flush 0.9 % injection 5-40 mL  5-40 mL IntraVENous 2 times per day Rojas Mario MD   10 mL at 10/26/21 0823    sodium chloride flush 0.9 % injection 5-40 mL  5-40 mL IntraVENous PRN Rojas Mario MD        0.9 % sodium chloride infusion  25 mL IntraVENous PRN Rojas Mario MD        aspirin chewable tablet 81 mg  81 mg Oral Daily Nicholas Kellie Fling, DO   81 mg at 10/26/21 8327    atorvastatin (LIPITOR) tablet 20 mg  20 mg Oral Daily Nicholas Kellie Fling, DO   20 mg at 10/26/21 2935    docusate sodium (COLACE) capsule 100 mg  100 mg Oral Nightly Nicholas Kellie Fling, DO   100 mg at 10/25/21 2218    ondansetron (ZOFRAN) injection 4 mg  4 mg IntraVENous Q6H PRN Neldon Blackbird, DO   4 mg at 10/26/21 0700    acetaminophen (TYLENOL) tablet 650 mg  650 mg Oral Q4H PRN Nicholas Kellie Fling, DO        albuterol (PROVENTIL) nebulizer solution 2.5 mg  2.5 mg Nebulization Q4H PRN Neldon Blackbird, DO        ipratropium (ATROVENT) 0.02 % nebulizer solution 0.5 mg  0.5 mg Nebulization 4x daily Neldon Blackbird, DO   0.5 mg at 10/26/21 0913    Arformoterol Tartrate (BROVANA) nebulizer solution 15 mcg  15 mcg Nebulization BID Neldon Blackbird, DO   15 mcg at 10/26/21 3852       Allergies:  No Known Allergies    Problem List:    Patient Active Problem List   Diagnosis Code    Coronary artery disease I25.10    Sleep apnea G47.30    BPH (benign prostatic hyperplasia) N40.0    Hyperlipidemia E78.5  Hypertension I10    Generalized abdominal pain R10.84    Obstruction of intestine K56.609    Cholelithiasis K80.20    Small bowel obstruction (HCC) K56.609    Bowel obstruction (HCC) K56.609    Shortness of breath R06.02    Complete AV block (HCC) I44.2    Third degree heart block (HCC) I44.2       Past Medical History:        Diagnosis Date    BPH (benign prostatic hyperplasia)     Bradycardia 10/25/2021    Emergent TVP placed    Coronary artery disease     Diverticulitis of colon     Hyperlipidemia     Hypertension     Intestinal ulcer     PUD (peptic ulcer disease)     Sleep apnea     Possible       Past Surgical History:        Procedure Laterality Date    CORONARY ANGIOPLASTY  09    DIAGNOSTIC CARDIAC CATH LAB PROCEDURE  09    NOSE SURGERY      SMALL INTESTINE SURGERY         Social History:    Social History     Tobacco Use    Smoking status: Former Smoker     Packs/day: 1.00     Years: 35.00     Pack years: 35.00     Types: Cigarettes     Start date:      Quit date:      Years since quittin.    Smokeless tobacco: Never Used   Substance Use Topics    Alcohol use: Yes     Comment: occasionally                                Counseling given: Not Answered      Vital Signs (Current):   Vitals:    10/26/21 0912 10/26/21 0913 10/26/21 1000 10/26/21 1030   BP:   129/73 128/78   Pulse:   81 80   Resp: 29 19 21 20   Temp:    37.4 °C (99.4 °F)   TempSrc:       SpO2: 94% 97% 91% 91%   Weight:       Height:                                                  BP Readings from Last 3 Encounters:   10/26/21 128/78   21 130/80   21 130/80       NPO Status: Time of last liquid consumption:  (Sips of water with meds this morning)                        Time of last solid consumption:                         Date of last liquid consumption: 10/25/21                        Date of last solid food consumption: 10/25/21    BMI:   Wt Readings from Last 3 Encounters:   10/25/21 250 lb (113.4 kg)   09/08/21 246 lb (111.6 kg)   08/04/21 246 lb (111.6 kg)     Body mass index is 32.1 kg/m². CBC:   Lab Results   Component Value Date    WBC 10.7 10/26/2021    RBC 4.43 10/26/2021    HGB 13.0 10/26/2021    HCT 37.6 10/26/2021    MCV 84.9 10/26/2021    RDW 14.2 10/26/2021     10/26/2021       CMP:   Lab Results   Component Value Date     10/26/2021    K 3.8 10/26/2021     10/26/2021    CO2 21 10/26/2021    BUN 22 10/26/2021    CREATININE 1.1 10/26/2021    GFRAA >60 10/26/2021    LABGLOM >60 10/26/2021    GLUCOSE 127 10/26/2021    GLUCOSE 147 07/28/2011    PROT 6.2 10/26/2021    CALCIUM 8.5 10/26/2021    BILITOT 1.5 10/26/2021    ALKPHOS 70 10/26/2021    AST 15 10/26/2021    ALT 11 10/26/2021       POC Tests: No results for input(s): POCGLU, POCNA, POCK, POCCL, POCBUN, POCHEMO, POCHCT in the last 72 hours. Coags:   Lab Results   Component Value Date    PROTIME 12.2 10/25/2021    INR 1.1 10/25/2021    APTT 29.1 10/25/2021       HCG (If Applicable): No results found for: PREGTESTUR, PREGSERUM, HCG, HCGQUANT     ABGs: No results found for: PHART, PO2ART, DNO4ODO, UAN0MHO, BEART, B1DSJZXO     Type & Screen (If Applicable):  No results found for: LABABO, LABRH    Drug/Infectious Status (If Applicable):  No results found for: HIV, HEPCAB    COVID-19 Screening (If Applicable):   Lab Results   Component Value Date    COVID19 Not Detected 10/25/2021     EKG 10/25/21  Narrative & Impression    Ventricular-paced rhythm with occasional premature ventricular complexes  Abnormal ECG  When compared with ECG of 25-OCT-2021 13:57,  Significant changes have occurred       ECHO 10/25/21   Findings      Left Ventricle   significant abnormal septal motion due to paced beats. Normal left ventricle size and systolic function. Indeterminate diastolic function. Ejection fraction is visually estimated at 50-55%.       Right Ventricle   Normal right ventricle structure and function. Left Atrium   Normal sized left atrium. Right Atrium   Right Atrium is not clearly visualized. Mitral Valve   Structurally normal mitral valve. No mitral regurgitation. Aortic Valve   Focal calcification of the aortic valve   No hemodynamically significant aortic stenosis is present. No evidence of aortic valve regurgitation      Conclusions      Summary   significant abnormal septal motion due to paced beats. Normal left ventricle size and systolic function. limited study for LV function only    Anesthesia Evaluation   no history of anesthetic complications:   Airway: Mallampati: III  TM distance: >3 FB   Neck ROM: full  Mouth opening: > = 3 FB Dental: normal exam         Pulmonary: breath sounds clear to auscultation  (+) shortness of breath:  sleep apnea: on noncompliant,      (-) not a current smoker                          ROS comment: 3L NC   Cardiovascular:    (+) hypertension:, CAD:, CABG/stent:, hyperlipidemia      ECG reviewed  Rhythm: regular  Rate: normal  Echocardiogram reviewed         Beta Blocker:  Dose within 24 Hrs      ROS comment: Bradycardia, Emergent TVP placed     Neuro/Psych:   Negative Neuro/Psych ROS              GI/Hepatic/Renal:   (+) PUD,          ROS comment: Cholelithiasis  Small bowel obstruction    . Endo/Other:                      ROS comment: BPH Abdominal:   (+) obese,           Vascular: Other Findings:             Anesthesia Plan      MAC     ASA 3       Induction: intravenous. Anesthetic plan and risks discussed with patient. Plan discussed with CRNA and attending.                   Sebastian Eaton RN   10/26/2021

## 2021-10-26 NOTE — PROGRESS NOTES
PROGRESS NOTE     CARDIOLOGY    Chief complaint: Seen today for follow up, management & recommendations for coronary artery disease. He denies chest pain or shortness of breath today. His only complaint is being uncomfortable laying on his back in bed due to the femoral TVP. Wt Readings from Last 3 Encounters:   10/25/21 250 lb (113.4 kg)   09/08/21 246 lb (111.6 kg)   08/04/21 246 lb (111.6 kg)     Temp Readings from Last 3 Encounters:   10/26/21 98 °F (36.7 °C) (Oral)   09/08/21 98.6 °F (37 °C)   08/04/21 98.6 °F (37 °C)     BP Readings from Last 3 Encounters:   10/26/21 129/64   09/08/21 130/80   08/04/21 130/80     Pulse Readings from Last 3 Encounters:   10/26/21 85   09/08/21 73   08/04/21 66         Intake/Output Summary (Last 24 hours) at 10/26/2021 0920  Last data filed at 10/26/2021 0848  Gross per 24 hour   Intake 240 ml   Output 420 ml   Net -180 ml       Recent Labs     10/25/21  1334 10/26/21  0500   WBC 9.6 10.7   HGB 13.5 13.0   HCT 40.5 37.6   MCV 85.1 84.9    202     Recent Labs     10/25/21  1334 10/26/21  0500    139   K 3.7 3.8    107   CO2 20* 21*   BUN 23 22   CREATININE 1.2 1.1   MG 1.9  --      Recent Labs     10/25/21  1334   PROTIME 12.2   INR 1.1     Recent Labs     10/25/21  1334   CKTOTAL 47     No results for input(s): BNP in the last 72 hours. No results for input(s): CHOL, HDL, TRIG in the last 72 hours.     Invalid input(s): CHOLHDLR, LDLCALCU  Recent Labs     10/25/21  1334   TROPHS 15*         trimethobenzamide (TIGAN) injection 200 mg, Q6H PRN  0.9 % sodium chloride bolus, Once  sodium chloride flush 0.9 % injection 5-40 mL, 2 times per day  sodium chloride flush 0.9 % injection 5-40 mL, PRN  0.9 % sodium chloride infusion, PRN  aspirin chewable tablet 81 mg, Daily  atorvastatin (LIPITOR) tablet 20 mg, Daily  docusate sodium (COLACE) capsule 100 mg, Nightly  ondansetron (ZOFRAN) injection 4 mg, Q6H PRN  acetaminophen (TYLENOL) tablet 650 mg, Q4H PRN  albuterol (PROVENTIL) nebulizer solution 2.5 mg, Q4H PRN  ipratropium (ATROVENT) 0.02 % nebulizer solution 0.5 mg, 4x daily  Arformoterol Tartrate (BROVANA) nebulizer solution 15 mcg, BID        Review of systems:     Heart: as above   Lungs: as above   Eyes: denies changes in vision or discharge. Ears: denies changes in hearing or pain. Nose: denies epistaxis or masses   Throat: denies sore throat or trouble swallowing. Neuro: denies numbness, tingling, tremors. Skin: denies rashes or itching. : denies hematuria, dysuria   GI: denies vomiting, diarrhea   Psych: denies mood changed, anxiety, depression. Physical exam:    Constitutional: A&O x3, communicates well, no acute distress. Eyes: extraocular muscles intact, PERRL. Normal lids & conjunctiva. No icterus. ENT: clear, no bleeding. No external masses. Lips normal formation. Neck: supple, full ROM, no JVD, no bruits, no lymphadenopathy. No masses. trachea midline. Heart: regular rate & rhythm, normal S1 & S2, distant but no abnormal murmurs. Lungs: CTA. No accessory muscles. Abd: soft, non-tender. Normal bowel sounds. Neuro: Full ROM X 4, EOMI, no tremors. EXT: No bilateral lower extremity edema  Skin: warm, dry, intact. Good turgor. Psych: A&O x 3, normal behavior, not anxious. Assessment/Recommendations  1. Complete heart block with weakness and near syncope. Planning for changing TVP to permanent pacemaker this afternoon with the EP. Will defer to EP. Echo shows normal systolic function. Abnormal septal motion due to pacing. 2. Coronary artery disease. No symptoms this admission. 3. Recurring bronchitis. 4. Chronic dyspnea. 5. Hypertension. 6. Hypercholesterolemia  7. Sleep apnea  8. Obesity. 9. Replace potassium. 10. Echo today. 11. Resume his beta-blocker when okay with EP.  12. No general cardiology issues. Cardiology will sign off.     Note: This report was completed using computerized voice recognition software. Every effort has been made to ensure accuracy, however; and invert and computerized transcription errors may be present.

## 2021-10-26 NOTE — CARE COORDINATION
Chart reviewed, pt arrived with c/o weakness fatigue, EP consulted, found to have complete AV block,  Temporary pacing wire placement yesterday, for dual chamber PM implant today; cardiology saw for CAD management today, per note today, for echo today, resume BB when okay with EP, cardiology has s/o. This CM spoke with pt's spouse Kuldip Dhaliwal regarding discharge / transition of care plan. Per Kuldip Dhaliwal, pt from home with her; two story home, with one step to enter, kitchen, and bathroom on first floor, bedroom and bathroom on second floor (12-13 steps to second story); owns cane and walker however, ambulates independently prior to arrival, denies further DME needs at this time; therapy to see when appropriate, verified PCP and pharmacy; discharge plan is to return home with Kuldip Dhaliwal to provide transportation once medically stable.

## 2021-10-26 NOTE — PROGRESS NOTES
Hospitalist Progress Note      PCP: SABINA Awan - CNP    Date of Admission: 10/25/2021    Chief Complaint: Generalized weakness    Hospital Course: Patient presents to the emergency room with generalized weakness, shortness of breath. Patient was noted to be in third-degree heart block. Temporary venous pacemaker was placed. Patient was then admitted to ICU. Patient was seen by electrophysiology. Subjective: Denies new complaints  Awaiting pacemaker insertion today        Medications:  Reviewed    Infusion Medications    sodium chloride       Scheduled Medications    [START ON 10/27/2021] metoprolol succinate  25 mg Oral BID    famotidine (PEPCID) injection  20 mg IntraVENous Daily    sodium chloride  500 mL IntraVENous Once    sodium chloride flush  5-40 mL IntraVENous 2 times per day    aspirin  81 mg Oral Daily    atorvastatin  20 mg Oral Daily    docusate sodium  100 mg Oral Nightly    ipratropium  0.5 mg Nebulization 4x daily    Arformoterol Tartrate  15 mcg Nebulization BID     PRN Meds: trimethobenzamide, sodium chloride flush, sodium chloride, ondansetron, acetaminophen, albuterol      Intake/Output Summary (Last 24 hours) at 10/26/2021 1104  Last data filed at 10/26/2021 0848  Gross per 24 hour   Intake 240 ml   Output 420 ml   Net -180 ml       Exam:    /78   Pulse 80   Temp 99.4 °F (37.4 °C)   Resp 20   Ht 6' 2\" (1.88 m)   Wt 250 lb (113.4 kg)   SpO2 91%   BMI 32.10 kg/m²     General appearance: No apparent distress, appears stated age and cooperative. HEENT: Pupils equal, round, and reactive to light. Conjunctivae/corneas clear. Neck: Supple, with full range of motion. No jugular venous distention. Trachea midline. Respiratory:  Normal respiratory effort. Clear to auscultation, bilaterally without Rales/Wheezes/Rhonchi. Cardiovascular: Regular rate and rhythm with normal S1/S2 without murmurs, rubs or gallops.   Abdomen: Soft, non-tender, non-distended with normal bowel sounds. Musculoskeletal: No clubbing, cyanosis or edema bilaterally. Full range of motion without deformity. Skin: Skin color, texture, turgor normal.  No rashes or lesions. Neurologic:  Neurovascularly intact without any focal sensory/motor deficits.  Cranial nerves: II-XII intact, grossly non-focal.  Psychiatric: Alert and oriented, thought content appropriate, normal insight    Labs:   Recent Labs     10/25/21  1334 10/26/21  0500   WBC 9.6 10.7   HGB 13.5 13.0   HCT 40.5 37.6    202     Recent Labs     10/25/21  1334 10/26/21  0500    139   K 3.7 3.8    107   CO2 20* 21*   BUN 23 22   CREATININE 1.2 1.1   CALCIUM 8.9 8.5*     Recent Labs     10/25/21  1334 10/26/21  0500   AST 16 15   ALT 13 11   BILITOT 1.1 1.5*   ALKPHOS 103 70     Recent Labs     10/25/21  1334   INR 1.1     Recent Labs     10/25/21  1334   CKTOTAL 47       Assessment/Plan:    Active Hospital Problems    Diagnosis Date Noted    Third degree heart block ([facility] [address] [I44.2] 10/25/2021   BPH by history  Obstructive sleep apnea by history  Coronary artery disease underlying without angina  Hypertension  Hyperlipidemia    For pacemaker insertion today  EP following      DVT Prophylaxis: SCDs  Diet: Diet NPO  Code Status: Full Code    PT/OT Eval Status: Not applicable    Dispo -based on clinical progress    Kun Milan MD

## 2021-10-27 ENCOUNTER — APPOINTMENT (OUTPATIENT)
Dept: GENERAL RADIOLOGY | Age: 81
DRG: 244 | End: 2021-10-27
Payer: MEDICARE

## 2021-10-27 VITALS
DIASTOLIC BLOOD PRESSURE: 66 MMHG | OXYGEN SATURATION: 93 % | HEART RATE: 82 BPM | SYSTOLIC BLOOD PRESSURE: 122 MMHG | BODY MASS INDEX: 32.08 KG/M2 | HEIGHT: 74 IN | TEMPERATURE: 98.2 F | WEIGHT: 250 LBS | RESPIRATION RATE: 18 BRPM

## 2021-10-27 LAB
ANION GAP SERPL CALCULATED.3IONS-SCNC: 10 MMOL/L (ref 7–16)
BUN BLDV-MCNC: 17 MG/DL (ref 6–23)
CALCIUM SERPL-MCNC: 8.7 MG/DL (ref 8.6–10.2)
CHLORIDE BLD-SCNC: 106 MMOL/L (ref 98–107)
CO2: 23 MMOL/L (ref 22–29)
CREAT SERPL-MCNC: 1.2 MG/DL (ref 0.7–1.2)
GFR AFRICAN AMERICAN: >60
GFR NON-AFRICAN AMERICAN: 58 ML/MIN/1.73
GLUCOSE BLD-MCNC: 132 MG/DL (ref 74–99)
POTASSIUM SERPL-SCNC: 4.1 MMOL/L (ref 3.5–5)
SODIUM BLD-SCNC: 139 MMOL/L (ref 132–146)

## 2021-10-27 PROCEDURE — 2580000003 HC RX 258: Performed by: STUDENT IN AN ORGANIZED HEALTH CARE EDUCATION/TRAINING PROGRAM

## 2021-10-27 PROCEDURE — 71045 X-RAY EXAM CHEST 1 VIEW: CPT

## 2021-10-27 PROCEDURE — 6370000000 HC RX 637 (ALT 250 FOR IP): Performed by: INTERNAL MEDICINE

## 2021-10-27 PROCEDURE — 97165 OT EVAL LOW COMPLEX 30 MIN: CPT

## 2021-10-27 PROCEDURE — 99024 POSTOP FOLLOW-UP VISIT: CPT | Performed by: INTERNAL MEDICINE

## 2021-10-27 PROCEDURE — 93280 PM DEVICE PROGR EVAL DUAL: CPT | Performed by: INTERNAL MEDICINE

## 2021-10-27 PROCEDURE — 2500000003 HC RX 250 WO HCPCS: Performed by: INTERNAL MEDICINE

## 2021-10-27 PROCEDURE — 80048 BASIC METABOLIC PNL TOTAL CA: CPT

## 2021-10-27 PROCEDURE — 36415 COLL VENOUS BLD VENIPUNCTURE: CPT

## 2021-10-27 PROCEDURE — 97535 SELF CARE MNGMENT TRAINING: CPT

## 2021-10-27 PROCEDURE — 6360000002 HC RX W HCPCS: Performed by: INTERNAL MEDICINE

## 2021-10-27 PROCEDURE — 94640 AIRWAY INHALATION TREATMENT: CPT

## 2021-10-27 PROCEDURE — 97530 THERAPEUTIC ACTIVITIES: CPT

## 2021-10-27 RX ADMIN — METOPROLOL SUCCINATE 25 MG: 25 TABLET, EXTENDED RELEASE ORAL at 09:08

## 2021-10-27 RX ADMIN — FAMOTIDINE 20 MG: 10 INJECTION INTRAVENOUS at 09:08

## 2021-10-27 RX ADMIN — IPRATROPIUM BROMIDE 0.5 MG: 0.5 SOLUTION RESPIRATORY (INHALATION) at 13:18

## 2021-10-27 RX ADMIN — ATORVASTATIN CALCIUM 20 MG: 20 TABLET, FILM COATED ORAL at 09:09

## 2021-10-27 RX ADMIN — ARFORMOTEROL TARTRATE 15 MCG: 15 SOLUTION RESPIRATORY (INHALATION) at 08:36

## 2021-10-27 RX ADMIN — IPRATROPIUM BROMIDE 0.5 MG: 0.5 SOLUTION RESPIRATORY (INHALATION) at 08:35

## 2021-10-27 RX ADMIN — ASPIRIN 81 MG CHEWABLE TABLET 81 MG: 81 TABLET CHEWABLE at 09:08

## 2021-10-27 RX ADMIN — SODIUM CHLORIDE, PRESERVATIVE FREE 10 ML: 5 INJECTION INTRAVENOUS at 09:08

## 2021-10-27 ASSESSMENT — PAIN SCALES - GENERAL
PAINLEVEL_OUTOF10: 0
PAINLEVEL_OUTOF10: 0

## 2021-10-27 NOTE — PROGRESS NOTES
Pt ambulated in hallway and tolerated well. Pulse ox maintained between 90-93% on room air while ambulating, and 94% room air at rest. Denies c/o.

## 2021-10-27 NOTE — DISCHARGE SUMMARY
Hospital Medicine Discharge Summary    Patient ID: Rubin Perez      Patient's PCP: SABINA Awan CNP    Admit Date: 10/25/2021     Discharge Date:    10/27/2021    Admitting Physician: No admitting provider for patient encounter. Discharge Physician: Ana Santana MD      Condition at discharge: Stable    Disposition: Home    Discharge Diagnoses: Active Hospital Problems    Diagnosis Date Noted    AV block, 3rd degree ([address] [I44.2] 10/25/2021       The patient was seen and examined on day of discharge and this discharge summary is in conjunction with any daily progress note from day of discharge. Hospital Course:   Mr. Rubin Perez, a 80y.o. year old male  who  has a past medical history of BPH (benign prostatic hyperplasia), Bradycardia, Coronary artery disease, Diverticulitis of colon, Hyperlipidemia, Hypertension, Intestinal ulcer, PUD (peptic ulcer disease), and Sleep apnea. Patient comes into hospital with complaints of weakness and dizziness. Patient was noted to have bradycardia and he was noted to be in third-degree AV block. Consultation was placed to cardiology and EP and patient had temporary pacemaker placed initially and he underwent permanent pacemaker placement on 10/26/2021. Overall he is doing well today, seen by EP and they were okay with him going home. Patient otherwise denies any headache, dizziness, chest pain. Discussed with patient's family at bedside. His blood pressure remains slightly low and so he will be taken off Norvasc. He will need close follow-up with EP and PCP as outpatient.     Consults:     IP CONSULT TO ELECTROPHYSIOLOGY  IP CONSULT TO CARDIOLOGY  IP CONSULT TO INTERNAL MEDICINE  IP CONSULT TO CRITICAL CARE    Significant Diagnostic Studies:  As above      Discharge Instructions/Follow-up:  As above       Activity: activity as tolerated    Physical Exam:  Vitals:    10/27/21 0830   BP: (!) 116/58   Pulse: 97   Resp: 18   Temp: 98 °F (36.7 °C)   SpO2: 91%       Respiratory:  Clear to auscultation, good air entry. Cardiovascular: RRR, no murmur. Abdomen: Soft, non-tender, non-distended with normal bowel sounds. Neurologic: awake, alert and following commands     Labs: For convenience and continuity at follow-up the following most recent labs are provided:      CBC:    Lab Results   Component Value Date    WBC 12.1 10/26/2021    HGB 14.2 10/26/2021    HCT 41.0 10/26/2021     10/26/2021       Renal:    Lab Results   Component Value Date     10/27/2021    K 4.1 10/27/2021     10/27/2021    CO2 23 10/27/2021    BUN 17 10/27/2021    CREATININE 1.2 10/27/2021    CALCIUM 8.7 10/27/2021         Discharge Medications:     Current Discharge Medication List           Details   albuterol sulfate  (90 Base) MCG/ACT inhaler INHALE 2 PUFFS INTO THE LUNGS EVERY 4 HOURS AS NEEDED      Cyanocobalamin (VITAMIN B-12 IJ) Inject as directed every 30 days      tiotropium-olodaterol (STIOLTO RESPIMAT) 2.5-2.5 MCG/ACT AERS Inhale 2 puffs into the lungs daily  Qty: 3 each, Refills: 3    Comments: 90 day supply      glycopyrrolate-formoterol (BEVESPI) 9-4.8 MCG/ACT AERO Inhale 2 puffs into the lungs 2 times daily  Qty: 1 each, Refills: 3      fluticasone (FLONASE) 50 MCG/ACT nasal spray 1 spray by Each Nostril route daily  Qty: 1 Bottle, Refills: 5      Azilsartan-Chlorthalidone (EDARBYCLOR) 40-12.5 MG TABS Take by mouth daily      Probiotic Product (PROBIOTIC-10 PO) Take 1 tablet by mouth daily      atorvastatin (LIPITOR) 20 MG tablet TAKE 1 TABLET BY MOUTH EVERY DAY  Refills: 2      vitamin D (ERGOCALCIFEROL) 10874 UNITS CAPS capsule Take 5,000 Units by mouth twice a week       aspirin (ASPIRIN CHILDRENS) 81 MG chewable tablet Take 1 tablet by mouth daily.   Qty: 30 tablet, Refills: 3      metoprolol (TOPROL XL) 25 MG XL tablet Take 25 mg by mouth 2 times daily                Signed:    Atiya Thomson MD   10/27/2021      Thank you SABINA Valenzuela CNP for the opportunity to be involved in this patient's care. If you have any questions or concerns please feel free to contact me. NOTE: This report was transcribed using voice recognition software. Every effort was made to ensure accuracy; however, inadvertent computerized transcription errors may be present.

## 2021-10-27 NOTE — PROGRESS NOTES
[facility],2[address] Floor and [facility]  [place] Note  PATIENT: Lon Hadley  MEDICAL RECORD NUMBER: [id number]  DATE OF SERVICE:  10/27/2021  ATTENDING ELECTROPHYSIOLOGIST: Collins Freedman MD   PRIMARY ELECTROPHYSIOLOGIST: Collins Freedman MD  REFERRING PHYSICIAN:  SABINA Galarza - CNP  CHIEF COMPLAINT: Fatigue and weakness    HPI: This is a 80 y.o. male with a history of   Patient Active Problem List   Diagnosis    Coronary artery disease    Sleep apnea    BPH (benign prostatic hyperplasia)    Hyperlipidemia    Hypertension    Generalized abdominal pain    Obstruction of intestine    Cholelithiasis    Small bowel obstruction (HCC)    Bowel obstruction (HCC)    Shortness of breath    Complete AV block ([address]    AV block, 3rd degree ([address]   Initial consult 10/25/21: The patient presents to the hospital complaining of generalized weakness and slow heart rate. The patient has history of coronary artery disease status post PCI of RCA on 7/13/2009, chronic bifascicular block, hypertension, hyperlipidemia, obstructive sleep apnea and obesity. The patient states that he has been diagnosed with chronic bronchitis for the past 3 months. He had been treated with antibiotic in the past and is currently using only inhalers. He reports occasional lightheadedness on and off and He reports today he felt weak and when he checked his pulse using his pulse oximetry, it showed that his HR was 35 bpm so he decided to come to ED. He denies any chest pain, dyspnea, palpitation, syncope, PND or orthopnea. In ED he was noted to be in normal sinus rhythm with complete AV block with escapes in RBBB and LAFB at 35 bpm. He was given IV Glucagon. He is currently taking ToproL XL mg bid for his CAD and last dose of ToproL XL was 10/24/21 at 10.00 PM. Cardiac electrophysiology service is consulted for complete AV block.     10/26/21: The patient is seen in the hospital for follow up. He denies any chest pain, dyspnea or palpitations. He remians in complete AV block with V paced at 60 bpm with PVCs. There were evidence of lost of capture noted intermittently at 1.00 AM and 7.00 AM suspected from positioning of the patient. Current Threshold was 0.5 mA and the output is setting at 5 mA. 10/27/21: The patient is seen in the hospital for follow-up, he has no cardiac complaints. He underwent placement of a dual chamber pacemaker on 10/26/21 today the Aquacel dressing is clean dry and intact without edema, ecchymosis or erythema, he has no complaints of pain at the device site and the device has normal function. Patient Active Problem List    Diagnosis Date Noted    AV block, 3rd degree ([address] 10/25/2021    Complete AV block ([address]     Shortness of breath 08/04/2021    Small bowel obstruction ([address] 09/04/2019    Bowel obstruction ([address] 09/04/2019    Cholelithiasis 02/14/2016    Obstruction of intestine 02/12/2016    Generalized abdominal pain 02/11/2016    Coronary artery disease      Overview Note:     A. Cardiac catheterization (7/13/09): Nondominant right coronary artery but with a large posterolateral branch supplying a portion of the inferior wall with 70% stenosis in the mid right coronary artery. B. Stenting of mid RCA with 3.5 x 12 mm  non drug-eluting stent.        Sleep apnea     BPH (benign prostatic hyperplasia)     Hyperlipidemia     Hypertension        Past Medical History:   Diagnosis Date    BPH (benign prostatic hyperplasia)     Bradycardia 10/25/2021    Emergent TVP placed    Coronary artery disease     Diverticulitis of colon     Hyperlipidemia     Hypertension     Intestinal ulcer     PUD (peptic ulcer disease)     Sleep apnea     Possible       Family History   Problem Relation Age of Onset    Heart Attack Father        Social History     Tobacco Use    Smoking status: Former Smoker     Packs/day: 1.00     Years: 35.00     Pack years: 35.00     Types: Cigarettes     Start date: 56     Quit date:      Years since quittin.8    Smokeless tobacco: Never Used   Substance Use Topics    Alcohol use: Yes     Comment: occasionally       Current Facility-Administered Medications   Medication Dose Route Frequency Provider Last Rate Last Admin    metoprolol succinate (TOPROL XL) extended release tablet 25 mg  25 mg Oral BID Irma Lyle, DO   25 mg at 10/27/21 09    famotidine (PEPCID) injection 20 mg  20 mg IntraVENous Daily Faith Abreu MD   20 mg at 10/27/21 0908    sodium chloride flush 0.9 % injection 5-40 mL  5-40 mL IntraVENous 2 times per day Gerldine Duane, DO   10 mL at 10/27/21 09    sodium chloride flush 0.9 % injection 5-40 mL  5-40 mL IntraVENous PRN Gerldine Duane, DO        0.9 % sodium chloride infusion  25 mL IntraVENous PRN Gerldine Duane, DO        trimethobenzamide Nestora Killings) injection 200 mg  200 mg IntraMUSCular Q6H PRN Evans Chong MD        0.9 % sodium chloride bolus  500 mL IntraVENous Once Evans Chong MD        sodium chloride flush 0.9 % injection 5-40 mL  5-40 mL IntraVENous 2 times per day Evans Chong MD   10 mL at 10/26/21 2021    sodium chloride flush 0.9 % injection 5-40 mL  5-40 mL IntraVENous PRN Evans Chong MD        0.9 % sodium chloride infusion  25 mL IntraVENous PRN Evans Chong MD        aspirin chewable tablet 81 mg  81 mg Oral Daily Nicholas Vieyra DO   81 mg at 10/27/21 0908    atorvastatin (LIPITOR) tablet 20 mg  20 mg Oral Daily Nicholas Vieyra DO   20 mg at 10/27/21 4897    docusate sodium (COLACE) capsule 100 mg  100 mg Oral Nightly Nicholas Vieyra DO   100 mg at 10/26/21 2021    ondansetron (ZOFRAN) injection 4 mg  4 mg IntraVENous Q6H PRN Marilu Yuan DO   4 mg at 10/26/21 0700    acetaminophen (TYLENOL) tablet 650 mg  650 mg Oral Q4H PRN Nicholas Vieyra DO        albuterol (PROVENTIL) nebulizer solution 2.5 mg  2.5 mg Nebulization Q4H PRN Rebecca Racer, DO        ipratropium (ATROVENT) 0.02 % nebulizer solution 0.5 mg  0.5 mg Nebulization 4x daily Nicholas Barrera, DO   0.5 mg at 10/27/21 0835    Arformoterol Tartrate (BROVANA) nebulizer solution 15 mcg  15 mcg Nebulization BID Rebecca Racer, DO   15 mcg at 10/27/21 0836        No Known Allergies    ROS:   Constitutional: Negative for fever,  activity change and negative for appetite change. HENT: Negative for epistaxis. Eyes: Negative for diploplia, blurred vision. Respiratory: Negative for cough, chest tightness, shortness of breath and wheezing. Cardiovascular: pertinent positives in HPI  Gastrointestinal: Negative for abdominal pain and blood in stool. All other review of systems are negative     PHYSICAL EXAM:   Vitals:    10/26/21 1941 10/26/21 2015 10/26/21 2315 10/27/21 0415   BP:  135/65 127/63 131/67   Pulse:  85 86 89   Resp: 20 21 20 20   Temp:  98.8 °F (37.1 °C) 98.8 °F (37.1 °C) 98.2 °F (36.8 °C)   TempSrc:  Temporal Temporal Temporal   SpO2: (!) 88% 92% 93% 92%   Weight:       Height:          Constitutional: Well-developed, no acute distress  Eyes: conjunctivae normal, no xanthelasma   Ears, Nose, Throat: oral mucosa moist, no cyanosis   CV: no JVD. Regular rate. No murmurs, rubs, or gallops. PMI is nondisplaced  Lungs: clear to auscultation bilaterally, normal respiratory effort without used of accessory muscles  Abdomen: soft, non-tender, bowel sounds present, no masses or hepatomegaly   Musculoskeletal: no digital clubbing, trace edema of LEs  Skin: warm, no rashes   Device site: left chest Aquacel intact no edema no ecchymosis, no erythema.      I have personally reviewed the laboratory, cardiac diagnostic and radiographic testing as outlined below:    Data:    Recent Labs     10/25/21  1334 10/26/21  0500 10/26/21  1537   WBC 9.6 10.7 12.1*   HGB 13.5 13.0 14.2   HCT 40.5 37.6 41.0    202 214 Recent Labs     10/26/21  0500 10/26/21  1537 10/27/21  0416    138 139   K 3.8 4.0 4.1    106 106   CO2 21* 19* 23   BUN 22 20 17   CREATININE 1.1 1.0 1.2   CALCIUM 8.5* 9.0 8.7      Lab Results   Component Value Date    MG 2.0 10/26/2021     Recent Labs     10/25/21  1334   TSH 2.720     Recent Labs     10/25/21  1334   INR 1.1           Telemetry 10/27/21: AS-     EKG 10/26/21: Sinus rhythm with V pacing rate 65 bpm  Please see scan in Cardiology. Echocardiogram 6/16/21:       Left Ventricle   Normal left ventricle size and systolic function   Stage I diastolic dysfunction   No wall motion abnormalities   Ejection fraction is visually estimated at 60%. Right Ventricle   Normal right ventricle structure and function. Left Atrium   Normal sized left atrium. Right Atrium   Normal right atrium size. Mitral Valve   Structurally normal mitral valve. No mitral regurgitation. No mitral stenosis. Tricuspid Valve   Trace tricuspid regurgitation. RVSP is 25 mmHg. Aortic Valve   Trileaflet aortic valve   No hemodynamically significant aortic stenosis is present. No evidence of aortic valve regurgitation      Pulmonic Valve   Mild to moderate pulmonic regurgitation. Pericardial Effusion   No evidence of pericardial effusion. Aorta   Aortic root dimension within normal limits. Miscellaneous   The inferior vena cava diameter is normal with normal respiratory   variation.       Conclusions      Summary   Normal left ventricle size and systolic function   Stage I diastolic dysfunction      Signature      ----------------------------------------------------------------   Electronically signed by Mindi Saenz DO (Interpreting   physician) on 06/17/2021 05:19 PM   ----------------------------------------------------------------    Cardiac Catheterization 7/13/09:   - Nondominant right coronary artery but with a large posterolateral branch supplying a portion of the inferior wall with 70% stenosis in the mid right coronary artery. - Stenting of mid RCA with 3.5 x 12 mm  non drug-eluting stent. Stress Test 3/7/18:   FINDINGS: The overall quality of the study was good. Left ventricular cavity size was noted to be normal.     Rotational analog analysis demonstrated no patient motion. The gated SPECT stress imaging in the short, vertical long, and   horizontal long axis demonstrated normal homogeneous tracer   distribution throughout the myocardium. A moderate defect was present in the inferior wall that was large   sized by quantification.          The resting images show no change. Gated SPECT left ventricular ejection fraction was calculated to   be 58% with normal myocardial thickening and wall motion. Impression:     1. ECG during the infusion did not change. 2. Myocardial perfusion imaging demonstrated a large sized fixed   defect in the inferior wall suggestive of a prior MI   3. Overall left ventricular systolic function was normal without   regional wall motion abnormalities. 4. Low risk general pharmacologic stress test.     Thank you for sending your patient to this [facility]. Device Interrogation ( 10/27/21 )  Make/Model Medtronic Elsa Mcmahon DDDR 60/120  P wave: 2.6 mV  Impedance: 399 ohms   Threshold: 0.75 V @ 0.4 ms  RV R wave: paced  Impedance: 394 ohms   Threshold: 0.5 V @ 0.4 ms  Pacing: A: 0.3%  RV: 99%   Battery Voltage/Longevity:  Initializing     Arrhythmias: none   Reprogramming included none   Overall device function is normal  All device programmable settings were evaluated per above and in the scanned document, along with iterative adjustments (capture thresholds) to assess and select the most appropriate final programming to provide for consistent delivery of the appropriate therapy and to verify function of the device.        I have independently reviewed all of the ECGs and rhythm strips per above     Assessment/Plan: This is a 80 y.o. male with a history of   Patient Active Problem List   Diagnosis    Coronary artery disease    Sleep apnea    BPH (benign prostatic hyperplasia)    Hyperlipidemia    Hypertension    Generalized abdominal pain    Obstruction of intestine    Cholelithiasis    Small bowel obstruction (HCC)    Bowel obstruction (HCC)    Shortness of breath    Complete AV block (HCC)    AV block, 3rd degree ([address]    who presents with complete AV block. 1. Complete AV block  - Underlying RBBB and LAFB since 1/3/2012 per EKG. 2. Chronic bifascicular block  - Diagnosed with RBBB and LAFB since 1/3/2012. 3. Pacemaker in situ   - DOI 10/26/21  - Medtronic, 99% RV pacing. 4. Coronary artery disease   - [facility] 7/13/2009 showed nondominant right coronary artery but with a large posterolateral branch supplying a portion of the inferior wall with 70% stenosis in the mid right coronary artery. - Status post PCI of RCA on 7/13/2009. - On ASA, Toprol XL and Lipitor at home. - Toprol XL is currently on hold. 5. Hypertension  - Well controlled. - On Edarbyclor, Toprol XL and Norvasc at home. - Currently not on any meds. 6. Hyperlipidemia  - On Lipitor    7. Obstructive sleep apnea     8. Obesity  -Body mass index is 32.1 kg/m². 9. Chronic bronchitis   - On Bevespi and Albuterol at home. - Currently on Brovana and Atrovent bebulizers. Recommendations:  1. Portable chest Xray today prior to discharge. 2. Standard arm restriction and wound care (see discharge instructions). 3. Ok to continue Toprol 25mg BID at this time given frequent PVCs seen 10/26.   4. Ok for discharge from an EP perspective with wound check in 2 weeks. Thank you for allowing me to participate in your patient's care. Please call me if there are any questions or concerns. Discussed with Dr. Walt Man.    Zay Nevarez, APRN - CNP  Cardiac Electrophysiology  [id number] [facility]  [facility] [place]: [place] [facility]  9:21 AM  10/27/2021    Attending Physician's Statement    Patient seen with the ANP. Agree with the findings, assessment and plan. Management plan was discussed. I have personally interviewed the patient, independently performed a focused cardiac examination, reviewed the pertinent laboratory and diagnostic testing with the patient and directly participated in the medical decision-making as noted above with the following additions:     Feeling much better. Denies any chest pain or dyspnea. Remains in NSR with A sensed and V paced. Pacemaker interrogation showed normal pacemaker function. CXR showed good lead positions and no pneumothorax. Physical examination showed no JVD, RRR, normal S1S2, no murmur, clear lungs bilaterally, no edema. Pacemaker site: no bleeding or hematoma. Normal pacemaker function. Can resume Toprol XL. OK to discharge home per EP perspectives when OK with others. Follow up in 2 weeks in device clinic.     Titi Husain MD  Cardiac Electrophysiology  [facility]  [facility] [place]: [place] [facility]  5:03 PM  10/27/2021

## 2021-10-27 NOTE — PROGRESS NOTES
Occupational Therapy  OCCUPATIONAL THERAPY INITIAL EVALUATION    [facility] [address]  [address] [place] [address]      Date:10/27/2021                                                Patient Name: Ilya Bolivar  MRN: [id number]  : 1940  Room: -A    Evaluating OT: Truett Fabry OTR/L #[id number]     Referring Provider: Shadia Payan,   Specific Provider Orders/Date: OT eval and treat 10/25/21    Diagnosis: Third degree heart block ([address] [I44.2]  AV block, 3rd degree ([address] [I44.2]  Fatigue, unspecified type [R53.83]   Pt admitted to hospital with weakness and SOB    Surgery / Procedure: pacemaker placement 10/26/21     Pertinent Medical History:  has a past medical history of BPH (benign prostatic hyperplasia), Bradycardia, Coronary artery disease, Diverticulitis of colon, Hyperlipidemia, Hypertension, Intestinal ulcer, PUD (peptic ulcer disease), and Sleep apnea.        Precautions:  Fall Risk, pacemaker precautions, sling / swathe    Assessment of current deficits    [x] Functional mobility  [x]ADLs  [x] Strength               []Cognition    [x] Functional transfers   [x] IADLs         [x] Safety Awareness   [x]Endurance    [] Fine Coordination              [x] Balance      [] Vision/perception   []Sensation     []Gross Motor Coordination  [] ROM  [] Delirium                   [] Motor Control     OT PLAN OF CARE   OT POC based on physician orders, patient diagnosis and results of clinical assessment    Frequency/Duration 1-3 days/wk for 2 weeks PRN   Specific OT Treatment Interventions to include:   * Instruction/training on adapted ADL techniques and AE recommendations to increase functional independence within precautions       * Training on energy conservation strategies, correct breathing pattern and techniques to improve independence/tolerance for self-care routine  * Functional transfer/mobility training/DME recommendations for increased independence, safety, and fall prevention  * Patient/Family education to increase follow through with safety techniques and functional independence  * Recommendation of environmental modifications for increased safety with functional transfers/mobility and ADLs  * Therapeutic exercise to improve motor endurance, ROM, and functional strength for ADLs/functional transfers  * Therapeutic activities to facilitate/challenge dynamic balance, stand tolerance for increased safety and independence with ADLs    Recommended Adaptive Equipment:  TBD     Home Living: Pt lives with wife in a 2 story home with 2 SYLVIA and 1 bay rail. .  Bed/bath on 2nd floor; 12-13 steps and 1 hand rail    Bathroom setup: tub/shower combo     Equipment owned: w/w, cane    Prior Level of Function: Independent with ADLs , Independent with IADLs; ambulated without AD   Driving: yes   Occupation: retired    Pain Level: Pt denies pain this session    Cognition: A&O: 4/4; Follows 2 step directions   Memory:  good   Sequencing:  good   Problem solving:  good   Judgement/safety:  fair     Functional Assessment:  AM-PAC Daily Activity Raw Score: 19/24   Initial Eval Status  Date: 10/27/21 Treatment Status  Date: STGs = LTGs  Time frame: 10-14 days   Feeding Modified Withams      Grooming Stand by Assist   Independent    UB Dressing Minimal Assist     Cuing on modified techniques due to pacemaker precautions  Modified Withams    LB Dressing Minimal Assist   Modified Withams    Bathing Minimal Assist  Modified Withams    Toileting Minimal Assist   Modified Withams    Bed Mobility  Supine to sit: NT   Sit to supine: NT   Supine to sit: Modified Withams   Sit to supine: Modified Withams    Functional Transfers Stand by Assist   Modified Withams    Functional Mobility Stand by Assist     Ambulated in room without AD; mild unsteadiness  Modified Withams    Balance Sitting:     Static:  indep    Dynamic:sup  Standing: SBA Activity Tolerance F  G   Visual/  Perceptual Glasses: yes  wfl                  Hand Dominance right   Strength ROM Additional Info:    RUE   4/5 wfl good  and wfl FMC/dexterity noted during ADL tasks     LUE pacemaker precautions Sling/swathe good  and wfl FMC/dexterity noted during ADL tasks     Hearing: wfl  Sensation:wfl  Tone: wfl  Edema:none noted     Comments: Upon arrival patient seated at EOB and agreeable to OT Session. Therapist educated pt on role of OT, sling /swathe and pacemaker precautions. At end of session, patient seated in chair with call light and phone within reach. Overall patient demonstrated decreased independence and safety during completion of ADL/functional transfer/mobility tasks. Pt would benefit from continued skilled OT to increase safety and independence with completion of ADL/IADL tasks for functional independence and quality of life. Treatment: OT treatment provided this date includes: Facilitation of unsupported sitting balance (impacting ADLs; addressing posture, weight shifting, dynamic reaching), functional transfers (various surfaces), standing tolerance tasks (addressing posture, balance and activity tolerance while incorporating light functional reaching; impacting ADLs and functional activity) and functional ambulation tasks without AD (in preparation for item retrieval tasks; cuing on posture and safety) - skilled cuing on hand placement, posture, body mechanics, energy conservation techniques and safety. Therapist facilitated self-care retraining: UB/LB self-care tasks (socks, pants, adjustment of gown / sling) and grooming tasks while educating pt on modified techniques within pacemaker precautions, posture, safety and energy conservation techniques. Skilled monitoring of HR, O2 sats and pts response to treatment.         Rehab Potential: Good  for established goals     Patient / Family Goal: return home      Patient and/or family were instructed on functional diagnosis, prognosis/goals and OT plan of care. Demonstrated F understanding. Eval Complexity: Low    Time In: 730  Time Out: 810  Total Treatment Time: 23 minutes    Min Units   OT Eval Low 97165  x  1   OT Eval Medium 82231      OT Eval High 36554      OT Re-Eval [id number]       Therapeutic Ex 33093       Therapeutic Activities 34060  13  1   ADL/Self Care 71242  10  1   Orthotic Management 50924       Manual [id number]     Neuro Re-Ed [id number]       Non-Billable Time          Evaluation Time additionally includes thorough review of current medical information, gathering information on past medical history/social history and prior level of function, interpretation of standardized testing/informal observation of tasks, assessment of data and development of plan of care and goals.           Kyrgyz Parent OTR/L #[id number]

## 2021-10-27 NOTE — PLAN OF CARE
Problem: Anxiety/Stress:  Goal: Level of anxiety will decrease  Description: Level of anxiety will decrease  Outcome: Met This Shift     Problem: Cardiac Output - Decreased:  Goal: Hemodynamic stability will improve  Description: Hemodynamic stability will improve  Outcome: Met This Shift

## 2021-10-27 NOTE — PLAN OF CARE
Problem: Discharge Planning:  Goal: Participates in care planning  Description: Participates in care planning  Outcome: Completed  Goal: Discharged to appropriate level of care  Description: Discharged to appropriate level of care  Outcome: Completed     Problem: Anxiety/Stress:  Goal: Level of anxiety will decrease  Description: Level of anxiety will decrease  10/27/2021 1427 by Dick Fernández RN  Outcome: Completed  10/27/2021 0043 by Lefty Salgado RN  Outcome: Met This Shift     Problem: Cardiac Output - Decreased:  Goal: Hemodynamic stability will improve  Description: Hemodynamic stability will improve  10/27/2021 1427 by Dick Fernández RN  Outcome: Completed  10/27/2021 0043 by Lefty Salgado RN  Outcome: Met This Shift

## 2021-10-29 ENCOUNTER — TELEPHONE (OUTPATIENT)
Dept: CARDIOLOGY | Age: 81
End: 2021-10-29

## 2021-10-29 NOTE — TELEPHONE ENCOUNTER
I called Ulisses hCavez to see how he's doing since his PPM insertion on 10/26/21. He states he's doing fine & that the aquacel dressing remains intact. He denies any fevers, redness, bleeding, drainage, or swelling from PPM incision site. I reminded him to remove the aquacel dsg on 11/2/21 , not to touch the glue, & to continue to wear his sling at night for 4 weeks; along with not abducting the L arm above the shoulder. He's also aware of his follow up appt at the [address] [place] [address] Drive on 11/9/21 at 2:00 pm.  He was confused by his d/c medications so we reviewed them. He wanted to know why he was taken off of his norvasc and I reminded him it was because his blood pressure was running low. He verbalized understanding of his medications after our discussion and  he is thankful for the phone call.

## 2021-11-03 ENCOUNTER — TELEPHONE (OUTPATIENT)
Dept: NON INVASIVE DIAGNOSTICS | Age: 81
End: 2021-11-03

## 2021-11-03 ENCOUNTER — NURSE ONLY (OUTPATIENT)
Dept: NON INVASIVE DIAGNOSTICS | Age: 81
End: 2021-11-03

## 2021-11-03 NOTE — PROGRESS NOTES
Incision well approximated. Surgical glue intact. No redness, swelling, or drainage. Instructed to keep appointment on 11/9/21. Reinforced arm restrictions and wound care. Patient and his wife verbalized understanding.     Fred Cloud RN, BSN  [place]

## 2021-11-03 NOTE — TELEPHONE ENCOUNTER
Patient called in stating he notes some swelling and redness at his pacemaker incision. Asked patient to come into office this afternoon so we can assess site. He will come in at 1330.     Sandrine Moreno RN, BSN  [place]

## 2021-11-03 NOTE — PATIENT INSTRUCTIONS
Continue arm restrictions until 11/26  You may shower starting     Call if any signs or symptoms of infection [phone] ext: [phone]  Fevers, chills, redness, swelling or drainage.        Hook up home  Monitor  [facility] Stay connected: [phone]

## 2021-11-09 ENCOUNTER — NURSE ONLY (OUTPATIENT)
Dept: NON INVASIVE DIAGNOSTICS | Age: 81
End: 2021-11-09

## 2021-11-09 NOTE — PATIENT INSTRUCTIONS
Continue arm restrictions until 11/26/21  You may shower starting today    Call if any signs or symptoms of infection [phone] ext: [phone]  Fevers, chills, redness, swelling or drainage.        Hook up home  Monitor  [facility] Stay connected: [phone]

## 2021-12-08 PROBLEM — J45.20 MILD INTERMITTENT ASTHMA WITHOUT COMPLICATION: Status: ACTIVE | Noted: 2021-12-08

## 2021-12-28 LAB
ALBUMIN SERPL-MCNC: 3.8 G/DL (ref 3.5–5.2)
ALP BLD-CCNC: 79 U/L (ref 40–129)
ALT SERPL-CCNC: 20 U/L (ref 0–40)
ANION GAP SERPL CALCULATED.3IONS-SCNC: 12 MMOL/L (ref 7–16)
AST SERPL-CCNC: 22 U/L (ref 0–39)
BILIRUB SERPL-MCNC: 1.4 MG/DL (ref 0–1.2)
BUN BLDV-MCNC: 19 MG/DL (ref 6–23)
CALCIUM SERPL-MCNC: 8.9 MG/DL (ref 8.6–10.2)
CHLORIDE BLD-SCNC: 105 MMOL/L (ref 98–107)
CHOLESTEROL, TOTAL: 87 MG/DL (ref 0–199)
CO2: 23 MMOL/L (ref 22–29)
CREAT SERPL-MCNC: 1.2 MG/DL (ref 0.7–1.2)
GFR AFRICAN AMERICAN: >60
GFR NON-AFRICAN AMERICAN: 58 ML/MIN/1.73
GLUCOSE BLD-MCNC: 130 MG/DL (ref 74–99)
HDLC SERPL-MCNC: 24 MG/DL
LDL CHOLESTEROL CALCULATED: 12 MG/DL (ref 0–99)
POTASSIUM SERPL-SCNC: 4.1 MMOL/L (ref 3.5–5)
SODIUM BLD-SCNC: 140 MMOL/L (ref 132–146)
TOTAL PROTEIN: 7.6 G/DL (ref 6.4–8.3)
TRIGL SERPL-MCNC: 257 MG/DL (ref 0–149)
VLDLC SERPL CALC-MCNC: 51 MG/DL

## 2022-02-17 ENCOUNTER — NURSE ONLY (OUTPATIENT)
Dept: NON INVASIVE DIAGNOSTICS | Age: 82
End: 2022-02-17

## 2022-05-31 ENCOUNTER — TELEPHONE (OUTPATIENT)
Dept: ADMINISTRATIVE | Age: 82
End: 2022-05-31

## 2022-05-31 NOTE — TELEPHONE ENCOUNTER
Pt calling he was due in June for a yearly with Dr. Yvon Pacheco. Can you please call him when the July schedule is available to schedule. Thank you.

## 2022-08-31 ENCOUNTER — OFFICE VISIT (OUTPATIENT)
Dept: CARDIOLOGY CLINIC | Age: 82
End: 2022-08-31
Payer: MEDICARE

## 2022-08-31 VITALS
DIASTOLIC BLOOD PRESSURE: 82 MMHG | RESPIRATION RATE: 16 BRPM | BODY MASS INDEX: 33.05 KG/M2 | SYSTOLIC BLOOD PRESSURE: 138 MMHG | HEIGHT: 74 IN | HEART RATE: 66 BPM | WEIGHT: 257.5 LBS

## 2022-08-31 DIAGNOSIS — I25.119 CORONARY ARTERY DISEASE WITH ANGINA PECTORIS, UNSPECIFIED VESSEL OR LESION TYPE, UNSPECIFIED WHETHER NATIVE OR TRANSPLANTED HEART (HCC): Primary | ICD-10-CM

## 2022-08-31 PROCEDURE — 1123F ACP DISCUSS/DSCN MKR DOCD: CPT | Performed by: INTERNAL MEDICINE

## 2022-08-31 PROCEDURE — 93000 ELECTROCARDIOGRAM COMPLETE: CPT | Performed by: INTERNAL MEDICINE

## 2022-08-31 PROCEDURE — 99213 OFFICE O/P EST LOW 20 MIN: CPT | Performed by: INTERNAL MEDICINE

## 2022-08-31 RX ORDER — CYANOCOBALAMIN 1000 UG/ML
INJECTION INTRAMUSCULAR; SUBCUTANEOUS
COMMUNITY
Start: 2022-06-22

## 2022-08-31 NOTE — PROGRESS NOTES
Jazzy Ramón  1940  Date of Service: 2022    Patient Active Problem List    Diagnosis Date Noted    Mild intermittent asthma without complication     Cardiac pacemaker in situ     AV block, 3rd degree ([facility] 10/25/2021    Complete AV block ([facility]     Shortness of breath 2021    Small bowel obstruction ([facility] [address] [facility] 2019    Bowel obstruction ([facility] [address] [facility] 2019    Cholelithiasis 2016    Obstruction of intestine 2016    Generalized abdominal pain 2016    Coronary artery disease      Overview Note:     Cardiac catheterization (09): Nondominant right coronary artery but with a large posterolateral branch supplying a portion of the inferior wall with 70% stenosis in the mid right coronary artery. Stenting of mid RCA with 3.5 x 12 mm  non drug-eluting stent.        Sleep apnea     BPH (benign prostatic hyperplasia)     Hyperlipidemia     Hypertension        Social History     Socioeconomic History    Marital status:    Tobacco Use    Smoking status: Former     Packs/day: 1.00     Years: 35.00     Pack years: 35.00     Types: Cigarettes     Start date:      Quit date:      Years since quittin.6    Smokeless tobacco: Never   Vaping Use    Vaping Use: Never used   Substance and Sexual Activity    Alcohol use: Yes     Comment: occasionally    Drug use: No       Current Outpatient Medications   Medication Sig Dispense Refill    cyanocobalamin 1000 MCG/ML injection INJECT 1ML ONCE A MONTH AS DIRECTED      Azilsartan Medoxomil (EDARBI) 40 MG TABS Take by mouth      fluticasone (FLONASE) 50 MCG/ACT nasal spray 1 spray by Each Nostril route daily 1 Bottle 5    albuterol sulfate  (90 Base) MCG/ACT inhaler       Probiotic Product (PROBIOTIC-10 PO) Take 1 tablet by mouth daily      atorvastatin (LIPITOR) 20 MG tablet TAKE 1 TABLET BY MOUTH EVERY DAY  2    vitamin D (ERGOCALCIFEROL) 09974 UNITS CAPS capsule Take 5,000 Units by mouth twice a week Extraocular muscles intact. PERRL. Normal lids & conjunctiva. ENT:  Nares are clear & not bleeding. Moist mucosa. Normal lips formation. No external masses   NEURO: no tremors, full ROM x 4, EOMI. SKIN:  Warm, dry and intact. Normal turgor. EKG: A sensed, V paced. 66 bpm.      Assessment:   Coronary artery disease as outlined above. No symptoms at this time. Pacemaker  Chronic mild lower extremity edema. Obesity  Sleep apnea. Hypertension, well controled at this time. Hypercholesterolemia      Recommendations:  Continue to follow the cholesterol with Dr. Jac Felton. Continue his current cardiac medications. Thank you for allowing me to participate in your patient's care.       [address], [address] [facility]  Interventional Cardiology

## 2022-10-11 ENCOUNTER — OFFICE VISIT (OUTPATIENT)
Dept: NON INVASIVE DIAGNOSTICS | Age: 82
End: 2022-10-11
Payer: MEDICARE

## 2022-10-11 VITALS
BODY MASS INDEX: 34.01 KG/M2 | HEART RATE: 71 BPM | DIASTOLIC BLOOD PRESSURE: 88 MMHG | RESPIRATION RATE: 18 BRPM | HEIGHT: 73 IN | SYSTOLIC BLOOD PRESSURE: 134 MMHG | WEIGHT: 256.6 LBS

## 2022-10-11 DIAGNOSIS — I44.2 COMPLETE AV BLOCK (HCC): ICD-10-CM

## 2022-10-11 DIAGNOSIS — Z95.0 CARDIAC PACEMAKER IN SITU: Primary | ICD-10-CM

## 2022-10-11 DIAGNOSIS — I44.2 AV BLOCK, 3RD DEGREE (HCC): ICD-10-CM

## 2022-10-11 DIAGNOSIS — I25.119 CORONARY ARTERY DISEASE WITH ANGINA PECTORIS, UNSPECIFIED VESSEL OR LESION TYPE, UNSPECIFIED WHETHER NATIVE OR TRANSPLANTED HEART (HCC): ICD-10-CM

## 2022-10-11 PROCEDURE — 99215 OFFICE O/P EST HI 40 MIN: CPT | Performed by: INTERNAL MEDICINE

## 2022-10-11 PROCEDURE — 93000 ELECTROCARDIOGRAM COMPLETE: CPT | Performed by: INTERNAL MEDICINE

## 2022-10-11 PROCEDURE — 1123F ACP DISCUSS/DSCN MKR DOCD: CPT | Performed by: INTERNAL MEDICINE

## 2022-10-11 RX ORDER — MULTIVIT WITH MINERALS/LUTEIN
250 TABLET ORAL DAILY
COMMUNITY

## 2022-10-11 NOTE — PROGRESS NOTES
hospital for follow up. He denies any chest pain, dyspnea or palpitations. He remians in complete AV block with V paced at 60 bpm with PVCs. There were evidence of lost of capture noted intermittently at 1.00 AM and 7.00 AM suspected from positioning of the patient. Current Threshold was 0.5 mA and the output is setting at 5 mA. 10/27/21: The patient is seen in the hospital for follow-up, he has no cardiac complaints. He underwent placement of a dual chamber pacemaker on 10/26/21 today the Aquacel dressing is clean dry and intact without edema, ecchymosis or erythema, he has no complaints of pain at the device site and the device has normal function. 10/11/22: The patient is seen in cardiac electrophysiology clinic for follow up. The patient currently feels well but does reports SOB. He offers no complaints from a device POV. The device site looks well healed and free from infection or erosion. He reports shortness of breath with exertion. The patient denies any chest pain, palpitations, dizziness, syncope, orthopnea or paroxysmal nocturnal dyspnea. The patient continues to be followed remotely. Patient Active Problem List    Diagnosis Date Noted    Mild intermittent asthma without complication 65/14/2386    Cardiac pacemaker in situ     AV block, 3rd degree ([address] 10/25/2021    Complete AV block ([address]     Shortness of breath 08/04/2021    Small bowel obstruction ([address] 09/04/2019    Bowel obstruction ([address] 09/04/2019    Cholelithiasis 02/14/2016    Obstruction of intestine 02/12/2016    Generalized abdominal pain 02/11/2016    Coronary artery disease      Overview Note:     Cardiac catheterization (7/13/09): Nondominant right coronary artery but with a large posterolateral branch supplying a portion of the inferior wall with 70% stenosis in the mid right coronary artery. Stenting of mid RCA with 3.5 x 12 mm  non drug-eluting stent.        Sleep apnea     BPH (benign prostatic hyperplasia) Hyperlipidemia     Hypertension        Past Medical History:   Diagnosis Date    BPH (benign prostatic hyperplasia)     Bradycardia 10/25/2021    Emergent TVP placed    Coronary artery disease     Diverticulitis of colon     Hyperlipidemia     Hypertension     Intestinal ulcer     PUD (peptic ulcer disease)     Sleep apnea     Possible       Family History   Problem Relation Age of Onset    Heart Attack Father        Social History     Tobacco Use    Smoking status: Former     Packs/day: 1.00     Years: 35.00     Pack years: 35.00     Types: Cigarettes     Start date: 56     Quit date:      Years since quittin.7    Smokeless tobacco: Never   Substance Use Topics    Alcohol use: Yes     Comment: occasionally       Current Outpatient Medications   Medication Sig Dispense Refill    Ascorbic Acid (VITAMIN C) 250 MG tablet Take 250 mg by mouth daily      tiotropium-olodaterol (STIOLTO RESPIMAT) 2.5-2.5 MCG/ACT AERS Inhale 2 puffs into the lungs daily      cyanocobalamin 1000 MCG/ML injection INJECT 1ML ONCE A MONTH AS DIRECTED      Azilsartan Medoxomil 80 MG TABS Take 80 mg by mouth daily      fluticasone (FLONASE) 50 MCG/ACT nasal spray 1 spray by Each Nostril route daily 1 Bottle 5    albuterol sulfate  (90 Base) MCG/ACT inhaler as needed      Probiotic Product (PROBIOTIC-10 PO) Take 1 tablet by mouth daily      atorvastatin (LIPITOR) 20 MG tablet TAKE 1 TABLET BY MOUTH EVERY DAY  2    vitamin D (ERGOCALCIFEROL) 07299 UNITS CAPS capsule Take 5,000 Units by mouth twice a week       aspirin (ASPIRIN CHILDRENS) 81 MG chewable tablet Take 1 tablet by mouth daily. 30 tablet 3    metoprolol succinate (TOPROL XL) 25 MG extended release tablet Take 25 mg by mouth 2 times daily        No current facility-administered medications for this visit. No Known Allergies    ROS:   Constitutional: Negative for fever,  activity change and negative for appetite change. HENT: Negative for epistaxis.    Eyes: Negative for diploplia, blurred vision. Respiratory: Negative for cough and chest tightness. Positive for shortness of breath and negative for wheezing. Cardiovascular: pertinent positives in HPI  Gastrointestinal: Negative for abdominal pain and blood in stool. All other review of systems are negative     PHYSICAL EXAM:   Vitals:    10/11/22 1010   BP: 134/88   Pulse: 71   Resp: 18   Weight: 256 lb 9.6 oz (116.4 kg)   Height: 6' 1\" (1.854 m)        Constitutional: Well-developed, no acute distress  Eyes: conjunctivae normal, no xanthelasma   Ears, Nose, Throat: oral mucosa moist, no cyanosis   CV: no JVD. Regular rate. No murmurs, rubs, or gallops. PMI is nondisplaced  Lungs: clear to auscultation bilaterally, normal respiratory effort without used of accessory muscles  Abdomen: soft, non-tender, bowel sounds present, no masses or hepatomegaly   Musculoskeletal: no digital clubbing, trace edema of LEs  Skin: warm, no rashes   Device site: well healed. No erosion, infection or migration    I have personally reviewed the laboratory, cardiac diagnostic and radiographic testing as outlined below:    Data:    No results for input(s): WBC, HGB, HCT, PLT in the last 72 hours. No results for input(s): NA, K, CL, CO2, BUN, CREATININE, GLU, CALCIUM in the last 72 hours. Invalid input(s): MAGNESIUM     Lab Results   Component Value Date/Time    MG 2.0 10/26/2021 03:37 PM     No results for input(s): TSH in the last 72 hours. No results for input(s): INR in the last 72 hours. EKG 10/11/22 : Sinus rhythm with A sensed V paced rate 71 bpm  Please see scan in Cardiology. Echocardiogram 6/16/21:       Left Ventricle   Normal left ventricle size and systolic function   Stage I diastolic dysfunction   No wall motion abnormalities   Ejection fraction is visually estimated at 60%. Right Ventricle   Normal right ventricle structure and function. Left Atrium   Normal sized left atrium.       Right Atrium Normal right atrium size. Mitral Valve   Structurally normal mitral valve. No mitral regurgitation. No mitral stenosis. Tricuspid Valve   Trace tricuspid regurgitation. RVSP is 25 mmHg. Aortic Valve   Trileaflet aortic valve   No hemodynamically significant aortic stenosis is present. No evidence of aortic valve regurgitation      Pulmonic Valve   Mild to moderate pulmonic regurgitation. Pericardial Effusion   No evidence of pericardial effusion. Aorta   Aortic root dimension within normal limits. Miscellaneous   The inferior vena cava diameter is normal with normal respiratory   variation. Conclusions      Summary   Normal left ventricle size and systolic function   Stage I diastolic dysfunction      Signature      ----------------------------------------------------------------   Electronically signed by Solomon Slaughter DO (Interpreting   physician) on 06/17/2021 05:19 PM   ----------------------------------------------------------------    Cardiac Catheterization 7/13/09:   - Nondominant right coronary artery but with a large posterolateral branch supplying a portion of the inferior wall with 70% stenosis in the mid right coronary artery. - Stenting of mid RCA with 3.5 x 12 mm  non drug-eluting stent. Stress Test 3/7/18:   FINDINGS: The overall quality of the study was good. Left ventricular cavity size was noted to be normal.     Rotational analog analysis demonstrated no patient motion. The gated SPECT stress imaging in the short, vertical long, and   horizontal long axis demonstrated normal homogeneous tracer   distribution throughout the myocardium. A moderate defect was present in the inferior wall that was large   sized by quantification. The resting images show no change. Gated SPECT left ventricular ejection fraction was calculated to   be 58% with normal myocardial thickening and wall motion. Impression:     1.  ECG during the infusion did not change. 2. Myocardial perfusion imaging demonstrated a large sized fixed   defect in the inferior wall suggestive of a prior MI   3. Overall left ventricular systolic function was normal without   regional wall motion abnormalities. 4. Low risk general pharmacologic stress test.     Thank you for sending your patient to this [facility]. Device Interrogation 10/11/22:  Make/Model Medtronic Elsa [address]  Mode DDDR 60/120  P wave: 2.6 mV  Impedance: 494 ohms   Threshold: 1.0 V @ 0.4 ms  RV R wave: 7.9 mV  Impedance: 380 ohms   Threshold: 0.5 V @ 0.4 ms  Pacing: A: 34.1%  RV: 98.7%   Battery Voltage/Longevity: 10.4 years  Arrhythmias: none   Reprogramming included none   Overall device function is normal  All device programmable settings were evaluated per above and in the scanned document, along with iterative adjustments (capture thresholds) to assess and select the most appropriate final programming to provide for consistent delivery of the appropriate therapy and to verify function of the device. I have independently reviewed all of the ECGs and rhythm strips per above     Assessment/Plan: This is a 80 y.o. male with a history of   Patient Active Problem List   Diagnosis    Coronary artery disease    Sleep apnea    BPH (benign prostatic hyperplasia)    Hyperlipidemia    Hypertension    Generalized abdominal pain    Obstruction of intestine    Cholelithiasis    Small bowel obstruction (HCC)    Bowel obstruction (HCC)    Shortness of breath    Complete AV block (HCC)    AV block, 3rd degree (HCC)    Cardiac pacemaker in situ    Mild intermittent asthma without complication      1. Pacemaker in situ   - DOI 10/26/21  - Medtronic; dual chamber  - Normal device functon. - 98.7% RV pacing. 2. Complete AV block  - Underlying RBBB and LAFB since 1/3/2012 per EKG. - Status post pacemaker implantation.     3. Chronic bifascicular block  - Diagnosed with RBBB and LAFB since 1/3/2012. 4. Coronary artery disease   - [facility] 7/13/2009 showed nondominant right coronary artery but with a large posterolateral branch supplying a portion of the inferior wall with 70% stenosis in the mid right coronary artery. - Status post PCI of RCA on 7/13/2009. - On ASA, Toprol XL and Lipitor. 5. Hypertension  - Controlled. - On Edarbi and Toprol XL. 6. Hyperlipidemia  - On Lipitor    7. Obstructive sleep apnea     8. Obesity  -Body mass index is 33.85 kg/m². 9. Chronic bronchitis   - On Flonase and Albuterol. Recommendations:  1. Normal pacemaker function. 2. Obtain a TTE for updated cardiac structure and function due to dyspnea. 3. Remote interrogation every 3 months. 4. Follow up in 1 year. Encouraged the patient to call the office for any questions or concerns. Thank you for allowing me to participate in your patient's care. Please call me if there are any questions or concerns. I have spent a total of 40 minutes with the patient and the family reviewing the above stated recommendations. And a total of >50% of that time involved face-to-face time providing counseling and/or coordination of care with the other providers, preparation for the clinic visit, reviewing records/tests, counseling/education of the patient, ordering medications/tests/procedures, coordinating care, and documenting clinical information in the EHR.      Charmayne Guadalajara, MD  Cardiac Electrophysiology  [address]  [facility] [place]  11:07 AM  10/11/2022

## 2022-12-12 ENCOUNTER — TELEPHONE (OUTPATIENT)
Dept: CARDIOLOGY | Age: 82
End: 2022-12-12

## 2022-12-14 ENCOUNTER — HOSPITAL ENCOUNTER (OUTPATIENT)
Dept: CARDIOLOGY | Age: 82
Discharge: HOME | End: 2022-12-14
Payer: MEDICARE

## 2022-12-14 DIAGNOSIS — Z95.0 CARDIAC PACEMAKER IN SITU: ICD-10-CM

## 2022-12-14 DIAGNOSIS — I25.119 CORONARY ARTERY DISEASE WITH ANGINA PECTORIS, UNSPECIFIED VESSEL OR LESION TYPE, UNSPECIFIED WHETHER NATIVE OR TRANSPLANTED HEART (HCC): ICD-10-CM

## 2022-12-14 DIAGNOSIS — I44.2 AV BLOCK, 3RD DEGREE (HCC): ICD-10-CM

## 2022-12-14 PROCEDURE — 93306 TTE W/DOPPLER COMPLETE: CPT

## 2022-12-16 ENCOUNTER — TELEPHONE (OUTPATIENT)
Dept: NON INVASIVE DIAGNOSTICS | Age: 82
End: 2022-12-16

## 2022-12-16 NOTE — TELEPHONE ENCOUNTER
----- Message from Cricket Montano MD sent at 12/16/2022  7:07 AM EST -----  Echo showed low normal LV EF. Recommend nuclear stress test  due to dyspnea.  Thanks.  ----- Message -----  From: Trina Bonilla Incoming Cardiology Results From \[facility]\""  Sent: 12/15/2022   5:19 PM EST  To: Cricket Montano MD

## 2022-12-22 DIAGNOSIS — R94.31 ABNORMAL EKG: Primary | ICD-10-CM

## 2023-01-04 ENCOUNTER — TELEPHONE (OUTPATIENT)
Dept: CARDIOLOGY | Age: 83
End: 2023-01-04

## 2023-01-06 ENCOUNTER — TELEPHONE (OUTPATIENT)
Dept: CARDIOLOGY | Age: 83
End: 2023-01-06

## 2023-01-06 NOTE — TELEPHONE ENCOUNTER
SPOKE WITH PATIENT TO REMIND HIM OF INSTRUCTIONS FOR STRESS TEST. PATIENT CONFIRMED THAT HE UNDERSTOOD INSTRUCTIONS.

## 2023-01-09 ENCOUNTER — TELEPHONE (OUTPATIENT)
Dept: CARDIOLOGY | Age: 83
End: 2023-01-09

## 2023-01-09 NOTE — TELEPHONE ENCOUNTER
Spoke with patient and confirmed pharmacological stress test appointment on January 11, 2023 at 0900. Instructions for test,including bringing albuterol inhaler to appointment, and COVID-19 preprocedure information reviewed with patient, questions answered. Patient verbalized understanding. Also instructed to call office if unable to keep appointment.

## 2023-01-11 ENCOUNTER — HOSPITAL ENCOUNTER (OUTPATIENT)
Dept: CARDIOLOGY | Age: 83
Discharge: HOME | End: 2023-01-11
Payer: MEDICARE

## 2023-01-11 ENCOUNTER — TELEPHONE (OUTPATIENT)
Dept: NON INVASIVE DIAGNOSTICS | Age: 83
End: 2023-01-11

## 2023-01-11 VITALS
WEIGHT: 256 LBS | DIASTOLIC BLOOD PRESSURE: 82 MMHG | SYSTOLIC BLOOD PRESSURE: 146 MMHG | HEIGHT: 73 IN | HEART RATE: 70 BPM | RESPIRATION RATE: 16 BRPM | BODY MASS INDEX: 33.93 KG/M2

## 2023-01-11 DIAGNOSIS — R06.09 DOE (DYSPNEA ON EXERTION): Primary | ICD-10-CM

## 2023-01-11 DIAGNOSIS — R94.31 ABNORMAL EKG: Primary | ICD-10-CM

## 2023-01-11 DIAGNOSIS — I51.9 LV DYSFUNCTION: ICD-10-CM

## 2023-01-11 DIAGNOSIS — R94.31 ABNORMAL EKG: ICD-10-CM

## 2023-01-11 PROCEDURE — 6360000002 HC RX W HCPCS: Performed by: INTERNAL MEDICINE

## 2023-01-11 PROCEDURE — A9500 TC99M SESTAMIBI: HCPCS | Performed by: INTERNAL MEDICINE

## 2023-01-11 PROCEDURE — 2580000003 HC RX 258: Performed by: INTERNAL MEDICINE

## 2023-01-11 PROCEDURE — 93017 CV STRESS TEST TRACING ONLY: CPT

## 2023-01-11 PROCEDURE — 78452 HT MUSCLE IMAGE SPECT MULT: CPT

## 2023-01-11 PROCEDURE — 3430000000 HC RX DIAGNOSTIC RADIOPHARMACEUTICAL: Performed by: INTERNAL MEDICINE

## 2023-01-11 RX ORDER — SODIUM CHLORIDE 0.9 % (FLUSH) 0.9 %
10 SYRINGE (ML) INJECTION PRN
Status: DISCONTINUED | OUTPATIENT
Start: 2023-01-11 | End: 2023-01-12 | Stop reason: HOSPADM

## 2023-01-11 RX ORDER — TECHNETIUM TC-99M SESTAMIBI 1 MG/10ML
32 INJECTION INTRAVENOUS
Status: COMPLETED | OUTPATIENT
Start: 2023-01-11 | End: 2023-01-11

## 2023-01-11 RX ORDER — TECHNETIUM TC-99M SESTAMIBI 1 MG/10ML
10.7 INJECTION INTRAVENOUS
Status: COMPLETED | OUTPATIENT
Start: 2023-01-11 | End: 2023-01-11

## 2023-01-11 RX ADMIN — Medication 32 MILLICURIE: at 10:56

## 2023-01-11 RX ADMIN — SODIUM CHLORIDE, PRESERVATIVE FREE 10 ML: 5 INJECTION INTRAVENOUS at 10:55

## 2023-01-11 RX ADMIN — SODIUM CHLORIDE, PRESERVATIVE FREE 10 ML: 5 INJECTION INTRAVENOUS at 10:56

## 2023-01-11 RX ADMIN — REGADENOSON 0.4 MG: 0.08 INJECTION, SOLUTION INTRAVENOUS at 10:55

## 2023-01-11 RX ADMIN — SODIUM CHLORIDE, PRESERVATIVE FREE 10 ML: 5 INJECTION INTRAVENOUS at 08:55

## 2023-01-11 RX ADMIN — Medication 10.7 MILLICURIE: at 08:55

## 2023-01-11 NOTE — DISCHARGE INSTRUCTIONS
[address]      Instructions to Patients    The following are the instructions for patients who have had a procedure in our office today. Patient name: Jase Mcintosh    Radionuclide Activity: 40mCi of 99mTc-Sestamibi    Date Administered: 1/11/2023    Expires: 48 hours after scheduled appointment time      Patient may resume normal activity unless otherwise instructed. Patient may resume medications as normal.  If the need should arise, patient may call [phone] between the hours of 8:00am-5:00pm.  After hours there is at least one physician on-call at all times for those patients needing assistance. Patients may call [phone] and the answering service will direct the patient to one of our physicians for assistance. After the patient's test if they are going to be leaving from an airport in the near future they should take this letter with them to verify the test and radionuclide used for their test.      This letter verifies that the above named bearer received an injection of a radionuclide for medical purpose/usage only.         Electronically signed by Gifty Chaidez on 1/11/2023 at 10:41 AM

## 2023-01-11 NOTE — TELEPHONE ENCOUNTER
----- Message from Roseanna Tian MD sent at 1/11/2023  2:14 PM EST -----  Nuclear showed no ischemia but high risk for LV dysfunction. Advised to see Cardiology for dyspnea.  Thanks.  ----- Message -----  From: Ricardo Serrano MD  Sent: 1/11/2023   1:48 PM EST  To: Roseanna Tian MD

## 2023-01-11 NOTE — TELEPHONE ENCOUNTER
I reviewed the stress test images and the echo images again. The echo shows abnormal septal motion due to his pacemaker. There does appear to be some hypokinesis of the apex. However, the ejection fraction is 50 to 53%. I reviewed the stress test images on this the segami system. This does show a fixed inferior defect but the inferior wall is moving. I cannot exclude a prior infarct but most likely due to attenuation artifact. However, the apex is hypokinetic on the nuclear images indicating a prior apical myocardial infarction. The ejection fraction on the segami nuclear images is 38%. Visually the gated wall motion looks like the ejection fraction is better than this as well. I suspect that this is a gating problem due to the pacemaker. I would recommend that he gets a cardiac MRI if his pacemaker is MRI compatible to calculate his ejection fraction. However, this needs to be done in [facility] or [place] because we do not have the appropriate software here. Please schedule him for a cardiac MRI in [facility] or [place] regarding ejection fraction.

## 2023-01-11 NOTE — PROCEDURES
[facility] - [place] [address]  [phone]                Pharmacologic Stress Nuclear Gated SPECT Study    Name: [facility] Account Number: [id number]    :  1940          Sex: male         Date of Study:  2023    Height: 6' 1\" (185.4 cm)         Weight: 256 lb (116.1 kg)     Ordering Provider: Negin Lawton MD          PCP: SABINA Stephens - CNP      Cardiologist: Greg Vargas DO             Interpreting Physician: James Jimenez MD  _________________________________________________________________________________    Indication:   Evaluation of extent and severity of coronary artery disease    Clinical History:   Patient has prior history of coronary artery disease.    Resting ECG:    OR int .20 sec, QRS int .14 sec, QT int .50 sec; HR 70 bpm  Normal sinus rhythm and Left Bundle Branch Block    Procedure:   Pharmacologic stress testing was performed with regadenoson 0.4 mg for 15 seconds. The heart rate was 70 at baseline and ricky to 96 beats during the infusion.  This corresponds to 70% of maximum predicted heart rate.  The blood pressure at baseline was 146/82 and blood pressure at the end of infusion was 162/82.  Blood pressure response was normal during the stress procedure.     The patient experienced \"weird\" sensation during the infusion.    ECG during the infusion did not change.    IMAGING: Myocardial perfusion imaging was performed at rest 30-35 minutes following the intravenous injection of 10.7 mCi of (Tc-Sestamibi) followed by 10 ml of Normal Saline.  As per infusion protocol, the patient was injected intravenously with 32.0 mCi of (Tc-Sestamibi) followed by 10 ml of Normal Saline.  Gated post-stress tomographic imaging was performed 20-25 minutes after stress.     FINDINGS: The overall quality of the study was good.     Left ventricular cavity size was noted to be enlarged  on both rest and stress studies. Rotational analog analysis demonstrated no patient motion or abnormal extracardiac radioactivity. A severe defect was present in the apical inferior wall(s) that was  large sized by quantification. The resting images show no change. Gated SPECT left ventricular ejection fraction was calculated to be 32%, with akinesis of the apical inferior wall. Impression:    ECG during the infusion did not change. The myocardial perfusion imaging was abnormal. The abnormality was a a large sized fixed defect in the apical inferior wall suggestive of a prior MI. Overall left ventricular systolic function was abnormal with regional wall motion abnormalities. High risk general pharmacologic stress test due to LV dysfunction. Thank you for sending your patient to this [employer].      Electronically signed by Caryn So MD on 1/11/23 at 1:47 PM EST

## 2023-01-11 NOTE — TELEPHONE ENCOUNTER
Spoke with patient let him know results and recommendations. Patient verbalized understanding. Will forward to [facility]note 3[facility] for recommendations.

## 2023-01-12 NOTE — TELEPHONE ENCOUNTER
Let him know that the echo shows that there is an area on the tip of the heart that looks like it has had a prior heart attack and not moving as well as the other areas. However, it showed that the overall pumping function was at the low end of normal.  The stress test shows that there is an area of a prior heart attack which he has had on prior stress tests. However, the stress test has an estimated evaluation of the pumping function and states that the pumping function looks to be much less and calling the decreased heart pumping function as high risk. Therefore the electrophysiologist is concerned about the possible decreased pumping function. I looked at the echocardiogram and it does look like the pumping function is at the low end of normal or possibly just less than normal.  With this discrepancy I would like him to have a different test performed. I would like him to have a cardiac MRI to better evaluate the pumping function of the heart. This needs to be done in [facility] [employer] because our MRI does not have that software.

## 2023-01-13 NOTE — TELEPHONE ENCOUNTER
Sandip Butler CPT 40784 at [facility] has been approved. Eloise Fabry #[id number]. Valid 01/13/2023-07/12/2023. Scanned into chart.

## 2023-01-13 NOTE — TELEPHONE ENCOUNTER
Patient notified of Dr. Leyda Vickers assessment/recommendation. Cardiac MRI will be scheduled at [facility]. Prior auth pending.      Cardiac MRI (81395)  Dyspnea (R06.09)  LV Dysfunction (I51.9)

## 2023-01-25 ENCOUNTER — TELEPHONE (OUTPATIENT)
Dept: NON INVASIVE DIAGNOSTICS | Age: 83
End: 2023-01-25

## 2023-01-25 DIAGNOSIS — T82.110A PACEMAKER LEAD MALFUNCTION, INITIAL ENCOUNTER: Primary | ICD-10-CM

## 2023-01-25 NOTE — TELEPHONE ENCOUNTER
[facility] called requesting our office to order a Chest Xray for patient to have done prior to his MRI. Please advise if ok to order.  Thank you

## 2023-02-13 ENCOUNTER — TELEPHONE (OUTPATIENT)
Dept: NON INVASIVE DIAGNOSTICS | Age: 83
End: 2023-02-13

## 2023-02-13 ENCOUNTER — TELEPHONE (OUTPATIENT)
Dept: CARDIOLOGY CLINIC | Age: 83
End: 2023-02-13

## 2023-02-13 DIAGNOSIS — I10 HYPERTENSION, UNSPECIFIED TYPE: ICD-10-CM

## 2023-02-13 DIAGNOSIS — I25.119 CORONARY ARTERY DISEASE WITH ANGINA PECTORIS, UNSPECIFIED VESSEL OR LESION TYPE, UNSPECIFIED WHETHER NATIVE OR TRANSPLANTED HEART (HCC): Primary | ICD-10-CM

## 2023-02-13 NOTE — TELEPHONE ENCOUNTER
----- Message from Soto Alvares MD sent at 2/9/2023  3:49 PM EST -----  Cardiac MRI showed LV EF 41%. Recommend CRT-P upgrade to help for SOB and LV dysfunction.  Thanks.  ----- Message -----  From: Alice Collier  Sent: 2/9/2023  10:59 AM EST  To: Soto Alvares MD

## 2023-02-13 NOTE — TELEPHONE ENCOUNTER
Spoke with patient let him know results and recommendations. Patient verbalized understanding. Procedure scheduled on 03/10/23 at 11:30, patient to arrive at 9:30. Instructions mailed to patient. He verbalized understanding.

## 2023-02-13 NOTE — TELEPHONE ENCOUNTER
I discussed his cardiac MRI results with him. His ejection fraction is 41%. It does state that he has dyssynchronous contraction that appears to be due to his pacemaker. Cannot exclude a prior nontransmural myocardial infarction. I have discussed options with him. He then states that Dr. Yamila Lacey what talk to him and wants to put in a third wire for his pacemaker for cardiac resynchronization. I stated that I agree with this. I think that this is the first best step. I am noting that his blood pressure is running a little high. I would like him to start losartan 12.5 mg daily. BMP 1 week after that. I did not tell him about the losartan. He says that he is scheduled in March for this CRT device. Please have him follow-up with me 1 month after that.

## 2023-02-16 RX ORDER — LOSARTAN POTASSIUM 25 MG/1
12.5 TABLET ORAL DAILY
COMMUNITY
End: 2023-02-17 | Stop reason: SDUPTHER

## 2023-02-16 NOTE — TELEPHONE ENCOUNTER
Patient notified of Dr. Vinnie Montes recommendations. Losartan e-scribed. Order placed for BMP. F/U scheduled for  4/24/23 at 9:40 a.m.

## 2023-02-17 RX ORDER — LOSARTAN POTASSIUM 25 MG/1
12.5 TABLET ORAL DAILY
Qty: 45 TABLET | Refills: 3 | Status: SHIPPED | OUTPATIENT
Start: 2023-02-17

## 2023-02-27 DIAGNOSIS — I10 HYPERTENSION, UNSPECIFIED TYPE: ICD-10-CM

## 2023-02-27 DIAGNOSIS — I25.119 CORONARY ARTERY DISEASE WITH ANGINA PECTORIS, UNSPECIFIED VESSEL OR LESION TYPE, UNSPECIFIED WHETHER NATIVE OR TRANSPLANTED HEART (HCC): ICD-10-CM

## 2023-03-07 ENCOUNTER — TELEPHONE (OUTPATIENT)
Dept: NON INVASIVE DIAGNOSTICS | Age: 83
End: 2023-03-07

## 2023-03-07 NOTE — TELEPHONE ENCOUNTER
Patient called in to device clinic with several questions about his upcoming cardiac procedure. He is scheduled for an upgrade to CRT-P on March 10,2023. Explained to him the nature of the procedure, estimated length of recovery, and benefits of adding a third wire to a pacing system. Verbalized understanding of the information provided.

## 2023-03-09 ENCOUNTER — TELEPHONE (OUTPATIENT)
Dept: CARDIAC CATH/INVASIVE PROCEDURES | Age: 83
End: 2023-03-09

## 2023-03-10 ENCOUNTER — HOSPITAL ENCOUNTER (OUTPATIENT)
Dept: GENERAL RADIOLOGY | Age: 83
End: 2023-03-10
Attending: FAMILY MEDICINE
Payer: MEDICARE

## 2023-03-10 ENCOUNTER — ANESTHESIA EVENT (OUTPATIENT)
Dept: CARDIAC CATH/INVASIVE PROCEDURES | Age: 83
End: 2023-03-10

## 2023-03-10 ENCOUNTER — ANESTHESIA (OUTPATIENT)
Dept: CARDIAC CATH/INVASIVE PROCEDURES | Age: 83
End: 2023-03-10

## 2023-03-10 ENCOUNTER — HOSPITAL ENCOUNTER (OUTPATIENT)
Dept: CARDIAC CATH/INVASIVE PROCEDURES | Age: 83
Setting detail: OBSERVATION
Discharge: HOME | End: 2023-03-11
Attending: FAMILY MEDICINE | Admitting: FAMILY MEDICINE
Payer: MEDICARE

## 2023-03-10 PROBLEM — Z95.0 S/P PLACEMENT OF CARDIAC PACEMAKER: Status: ACTIVE | Noted: 2023-03-10

## 2023-03-10 PROBLEM — I42.8 NONISCHEMIC CARDIOMYOPATHY (HCC): Status: ACTIVE | Noted: 2023-03-10

## 2023-03-10 LAB
ANION GAP SERPL CALCULATED.3IONS-SCNC: 7 MMOL/L (ref 7–16)
BASOPHILS ABSOLUTE: 0.05 E9/L (ref 0–0.2)
BASOPHILS RELATIVE PERCENT: 0.6 % (ref 0–2)
BUN BLDV-MCNC: 14 MG/DL (ref 6–23)
CALCIUM SERPL-MCNC: 8.7 MG/DL (ref 8.6–10.2)
CHLORIDE BLD-SCNC: 108 MMOL/L (ref 98–107)
CO2: 25 MMOL/L (ref 22–29)
CREAT SERPL-MCNC: 1.1 MG/DL (ref 0.7–1.2)
EOSINOPHILS ABSOLUTE: 0.36 E9/L (ref 0.05–0.5)
EOSINOPHILS RELATIVE PERCENT: 4.1 % (ref 0–6)
GFR SERPL CREATININE-BSD FRML MDRD: >60 ML/MIN/1.73
GLUCOSE BLD-MCNC: 140 MG/DL (ref 74–99)
HCT VFR BLD CALC: 44.1 % (ref 37–54)
HEMOGLOBIN: 15.6 G/DL (ref 12.5–16.5)
IMMATURE GRANULOCYTES #: 0.03 E9/L
IMMATURE GRANULOCYTES %: 0.3 % (ref 0–5)
LYMPHOCYTES ABSOLUTE: 1.41 E9/L (ref 1.5–4)
LYMPHOCYTES RELATIVE PERCENT: 16.2 % (ref 20–42)
MAGNESIUM: 2.1 MG/DL (ref 1.6–2.6)
MCH RBC QN AUTO: 30.3 PG (ref 26–35)
MCHC RBC AUTO-ENTMCNC: 35.4 % (ref 32–34.5)
MCV RBC AUTO: 85.6 FL (ref 80–99.9)
MONOCYTES ABSOLUTE: 0.69 E9/L (ref 0.1–0.95)
MONOCYTES RELATIVE PERCENT: 7.9 % (ref 2–12)
NEUTROPHILS ABSOLUTE: 6.15 E9/L (ref 1.8–7.3)
NEUTROPHILS RELATIVE PERCENT: 70.9 % (ref 43–80)
PDW BLD-RTO: 14 FL (ref 11.5–15)
PLATELET # BLD: 216 E9/L (ref 130–450)
PMV BLD AUTO: 8.7 FL (ref 7–12)
POTASSIUM SERPL-SCNC: 4.1 MMOL/L (ref 3.5–5)
RBC # BLD: 5.15 E12/L (ref 3.8–5.8)
SODIUM BLD-SCNC: 140 MMOL/L (ref 132–146)
WBC # BLD: 8.7 E9/L (ref 4.5–11.5)

## 2023-03-10 PROCEDURE — 83735 ASSAY OF MAGNESIUM: CPT

## 2023-03-10 PROCEDURE — 3700000001 HC ADD 15 MINUTES (ANESTHESIA)

## 2023-03-10 PROCEDURE — G0378 HOSPITAL OBSERVATION PER HR: HCPCS

## 2023-03-10 PROCEDURE — 33229 REMV&REPLC PM GEN MULT LEADS: CPT | Performed by: INTERNAL MEDICINE

## 2023-03-10 PROCEDURE — C1730 CATH, EP, 19 OR FEW ELECT: HCPCS

## 2023-03-10 PROCEDURE — G0379 DIRECT REFER HOSPITAL OBSERV: HCPCS

## 2023-03-10 PROCEDURE — 2500000003 HC RX 250 WO HCPCS: Performed by: NURSE ANESTHETIST, CERTIFIED REGISTERED

## 2023-03-10 PROCEDURE — 6370000000 HC RX 637 (ALT 250 FOR IP): Performed by: INTERNAL MEDICINE

## 2023-03-10 PROCEDURE — 33229 REMV&REPLC PM GEN MULT LEADS: CPT

## 2023-03-10 PROCEDURE — 71045 X-RAY EXAM CHEST 1 VIEW: CPT

## 2023-03-10 PROCEDURE — C2621 PMKR, OTHER THAN SING/DUAL: HCPCS

## 2023-03-10 PROCEDURE — C1889 IMPLANT/INSERT DEVICE, NOC: HCPCS

## 2023-03-10 PROCEDURE — 85025 COMPLETE CBC W/AUTO DIFF WBC: CPT

## 2023-03-10 PROCEDURE — 6360000002 HC RX W HCPCS: Performed by: INTERNAL MEDICINE

## 2023-03-10 PROCEDURE — 93005 ELECTROCARDIOGRAM TRACING: CPT | Performed by: INTERNAL MEDICINE

## 2023-03-10 PROCEDURE — 2720000010 HC SURG SUPPLY STERILE

## 2023-03-10 PROCEDURE — C1769 GUIDE WIRE: HCPCS

## 2023-03-10 PROCEDURE — 2580000003 HC RX 258

## 2023-03-10 PROCEDURE — 2709999900 HC NON-CHARGEABLE SUPPLY

## 2023-03-10 PROCEDURE — 99215 OFFICE O/P EST HI 40 MIN: CPT | Performed by: INTERNAL MEDICINE

## 2023-03-10 PROCEDURE — 33225 L VENTRIC PACING LEAD ADD-ON: CPT | Performed by: INTERNAL MEDICINE

## 2023-03-10 PROCEDURE — 2580000003 HC RX 258: Performed by: INTERNAL MEDICINE

## 2023-03-10 PROCEDURE — 2500000003 HC RX 250 WO HCPCS

## 2023-03-10 PROCEDURE — 6360000002 HC RX W HCPCS: Performed by: NURSE ANESTHETIST, CERTIFIED REGISTERED

## 2023-03-10 PROCEDURE — 6360000002 HC RX W HCPCS

## 2023-03-10 PROCEDURE — 3700000000 HC ANESTHESIA ATTENDED CARE

## 2023-03-10 PROCEDURE — C1751 CATH, INF, PER/CENT/MIDLINE: HCPCS

## 2023-03-10 PROCEDURE — 2580000003 HC RX 258: Performed by: NURSE ANESTHETIST, CERTIFIED REGISTERED

## 2023-03-10 PROCEDURE — C1900 LEAD, CORONARY VENOUS: HCPCS

## 2023-03-10 PROCEDURE — C1892 INTRO/SHEATH,FIXED,PEEL-AWAY: HCPCS

## 2023-03-10 PROCEDURE — 80048 BASIC METABOLIC PNL TOTAL CA: CPT

## 2023-03-10 PROCEDURE — 33225 L VENTRIC PACING LEAD ADD-ON: CPT

## 2023-03-10 RX ORDER — ONDANSETRON 2 MG/ML
4 INJECTION INTRAMUSCULAR; INTRAVENOUS EVERY 6 HOURS PRN
Status: DISCONTINUED | OUTPATIENT
Start: 2023-03-10 | End: 2023-03-11 | Stop reason: HOSPADM

## 2023-03-10 RX ORDER — SODIUM CHLORIDE 0.9 % (FLUSH) 0.9 %
5-40 SYRINGE (ML) INJECTION EVERY 12 HOURS SCHEDULED
Status: DISCONTINUED | OUTPATIENT
Start: 2023-03-10 | End: 2023-03-11 | Stop reason: HOSPADM

## 2023-03-10 RX ORDER — SODIUM CHLORIDE 9 MG/ML
INJECTION, SOLUTION INTRAVENOUS CONTINUOUS PRN
Status: DISCONTINUED | OUTPATIENT
Start: 2023-03-10 | End: 2023-03-10 | Stop reason: SDUPTHER

## 2023-03-10 RX ORDER — SODIUM CHLORIDE 9 MG/ML
INJECTION, SOLUTION INTRAVENOUS PRN
Status: DISCONTINUED | OUTPATIENT
Start: 2023-03-10 | End: 2023-03-11 | Stop reason: HOSPADM

## 2023-03-10 RX ORDER — FLUTICASONE PROPIONATE 50 MCG
1 SPRAY, SUSPENSION (ML) NASAL DAILY
Status: DISCONTINUED | OUTPATIENT
Start: 2023-03-10 | End: 2023-03-11 | Stop reason: HOSPADM

## 2023-03-10 RX ORDER — SODIUM CHLORIDE 0.9 % (FLUSH) 0.9 %
5-40 SYRINGE (ML) INJECTION PRN
Status: DISCONTINUED | OUTPATIENT
Start: 2023-03-10 | End: 2023-03-11 | Stop reason: HOSPADM

## 2023-03-10 RX ORDER — OXYCODONE HYDROCHLORIDE 5 MG/1
10 TABLET ORAL EVERY 4 HOURS PRN
Status: DISCONTINUED | OUTPATIENT
Start: 2023-03-10 | End: 2023-03-11 | Stop reason: HOSPADM

## 2023-03-10 RX ORDER — MIDAZOLAM HYDROCHLORIDE 1 MG/ML
INJECTION INTRAMUSCULAR; INTRAVENOUS PRN
Status: DISCONTINUED | OUTPATIENT
Start: 2023-03-10 | End: 2023-03-10 | Stop reason: SDUPTHER

## 2023-03-10 RX ORDER — PHENYLEPHRINE HCL IN 0.9% NACL 1 MG/10 ML
SYRINGE (ML) INTRAVENOUS PRN
Status: DISCONTINUED | OUTPATIENT
Start: 2023-03-10 | End: 2023-03-10 | Stop reason: SDUPTHER

## 2023-03-10 RX ORDER — METOPROLOL SUCCINATE 25 MG/1
25 TABLET, EXTENDED RELEASE ORAL 2 TIMES DAILY
Status: DISCONTINUED | OUTPATIENT
Start: 2023-03-10 | End: 2023-03-11 | Stop reason: HOSPADM

## 2023-03-10 RX ORDER — OXYCODONE HYDROCHLORIDE 5 MG/1
5 TABLET ORAL EVERY 4 HOURS PRN
Status: DISCONTINUED | OUTPATIENT
Start: 2023-03-10 | End: 2023-03-11 | Stop reason: HOSPADM

## 2023-03-10 RX ORDER — SODIUM CHLORIDE 9 MG/ML
INJECTION, SOLUTION INTRAVENOUS ONCE
Status: COMPLETED | OUTPATIENT
Start: 2023-03-10 | End: 2023-03-11

## 2023-03-10 RX ORDER — CEFAZOLIN SODIUM 1 G/3ML
INJECTION, POWDER, FOR SOLUTION INTRAMUSCULAR; INTRAVENOUS PRN
Status: DISCONTINUED | OUTPATIENT
Start: 2023-03-10 | End: 2023-03-10 | Stop reason: SDUPTHER

## 2023-03-10 RX ORDER — ALBUTEROL SULFATE 2.5 MG/3ML
2.5 SOLUTION RESPIRATORY (INHALATION) EVERY 4 HOURS PRN
Status: DISCONTINUED | OUTPATIENT
Start: 2023-03-10 | End: 2023-03-11 | Stop reason: HOSPADM

## 2023-03-10 RX ORDER — ACETAMINOPHEN 325 MG/1
650 TABLET ORAL EVERY 4 HOURS PRN
Status: DISCONTINUED | OUTPATIENT
Start: 2023-03-10 | End: 2023-03-11 | Stop reason: HOSPADM

## 2023-03-10 RX ORDER — ASPIRIN 81 MG/1
81 TABLET, CHEWABLE ORAL DAILY
Status: DISCONTINUED | OUTPATIENT
Start: 2023-03-10 | End: 2023-03-11 | Stop reason: HOSPADM

## 2023-03-10 RX ORDER — PROPOFOL 10 MG/ML
INJECTION, EMULSION INTRAVENOUS PRN
Status: DISCONTINUED | OUTPATIENT
Start: 2023-03-10 | End: 2023-03-10 | Stop reason: SDUPTHER

## 2023-03-10 RX ORDER — LOSARTAN POTASSIUM 25 MG/1
12.5 TABLET ORAL DAILY
Status: ON HOLD | COMMUNITY
End: 2023-03-11 | Stop reason: HOSPADM

## 2023-03-10 RX ORDER — FENTANYL CITRATE 50 UG/ML
INJECTION, SOLUTION INTRAMUSCULAR; INTRAVENOUS PRN
Status: DISCONTINUED | OUTPATIENT
Start: 2023-03-10 | End: 2023-03-10 | Stop reason: SDUPTHER

## 2023-03-10 RX ORDER — LOSARTAN POTASSIUM 50 MG/1
100 TABLET ORAL DAILY
Status: DISCONTINUED | OUTPATIENT
Start: 2023-03-11 | End: 2023-03-11 | Stop reason: HOSPADM

## 2023-03-10 RX ORDER — ATORVASTATIN CALCIUM 20 MG/1
20 TABLET, FILM COATED ORAL DAILY
Status: DISCONTINUED | OUTPATIENT
Start: 2023-03-11 | End: 2023-03-11 | Stop reason: HOSPADM

## 2023-03-10 RX ADMIN — CEFAZOLIN 2 G: 1 INJECTION, POWDER, FOR SOLUTION INTRAMUSCULAR; INTRAVENOUS at 11:15

## 2023-03-10 RX ADMIN — SODIUM CHLORIDE 20 ML/HR: 9 INJECTION, SOLUTION INTRAVENOUS at 09:49

## 2023-03-10 RX ADMIN — FENTANYL CITRATE 50 MCG: 50 INJECTION, SOLUTION INTRAMUSCULAR; INTRAVENOUS at 11:15

## 2023-03-10 RX ADMIN — ASPIRIN 81 MG CHEWABLE TABLET 81 MG: 81 TABLET CHEWABLE at 20:47

## 2023-03-10 RX ADMIN — Medication 200 MCG: at 11:57

## 2023-03-10 RX ADMIN — Medication 100 MCG: at 11:35

## 2023-03-10 RX ADMIN — CEFAZOLIN 2000 MG: 2 INJECTION, POWDER, FOR SOLUTION INTRAMUSCULAR; INTRAVENOUS at 20:49

## 2023-03-10 RX ADMIN — PROPOFOL 23 MG: 10 INJECTION, EMULSION INTRAVENOUS at 11:20

## 2023-03-10 RX ADMIN — MIDAZOLAM 2 MG: 1 INJECTION INTRAMUSCULAR; INTRAVENOUS at 11:11

## 2023-03-10 RX ADMIN — Medication 150 MCG: at 11:41

## 2023-03-10 RX ADMIN — FENTANYL CITRATE 50 MCG: 50 INJECTION, SOLUTION INTRAMUSCULAR; INTRAVENOUS at 11:25

## 2023-03-10 RX ADMIN — ACETAMINOPHEN 650 MG: 325 TABLET ORAL at 20:46

## 2023-03-10 RX ADMIN — PROPOFOL 30 MCG/KG/MIN: 10 INJECTION, EMULSION INTRAVENOUS at 11:21

## 2023-03-10 RX ADMIN — Medication 100 MCG: at 11:30

## 2023-03-10 RX ADMIN — SODIUM CHLORIDE, PRESERVATIVE FREE 10 ML: 5 INJECTION INTRAVENOUS at 20:47

## 2023-03-10 RX ADMIN — SODIUM CHLORIDE: 9 INJECTION, SOLUTION INTRAVENOUS at 11:00

## 2023-03-10 RX ADMIN — Medication 150 MCG: at 11:50

## 2023-03-10 RX ADMIN — METOPROLOL SUCCINATE 25 MG: 25 TABLET, EXTENDED RELEASE ORAL at 20:47

## 2023-03-10 ASSESSMENT — PAIN DESCRIPTION - ORIENTATION: ORIENTATION: LEFT;UPPER

## 2023-03-10 ASSESSMENT — PAIN SCALES - GENERAL
PAINLEVEL_OUTOF10: 0
PAINLEVEL_OUTOF10: 0
PAINLEVEL_OUTOF10: 7
PAINLEVEL_OUTOF10: 0

## 2023-03-10 ASSESSMENT — ENCOUNTER SYMPTOMS
SHORTNESS OF BREATH: 1
DYSPNEA ACTIVITY LEVEL: AFTER AMBULATING 1 FLIGHT OF STAIRS

## 2023-03-10 ASSESSMENT — LIFESTYLE VARIABLES: SMOKING_STATUS: 0

## 2023-03-10 ASSESSMENT — PAIN DESCRIPTION - LOCATION: LOCATION: INCISION

## 2023-03-10 ASSESSMENT — PAIN DESCRIPTION - DESCRIPTORS: DESCRIPTORS: ACHING

## 2023-03-10 NOTE — ANESTHESIA PRE PROCEDURE
Department of Anesthesiology  Preprocedure Note       Name:  Lydia Mak   Age:  80 y.o.  :  1940                                          MRN:  [id number]         Date:  3/10/2023      Surgeon: Julia Schmid    Procedure: upgrade PPM to BIV PPM, venogram    Medications prior to admission:   Prior to Admission medications    Medication Sig Start Date End Date Taking? Authorizing Provider   losartan (COZAAR) 25 MG tablet Take 0.5 tablets by mouth daily 23   Zainab David DO   cyanocobalamin 1000 MCG/ML injection INJECT 1ML ONCE A MONTH AS DIRECTED 22   Historical Provider, MD   Azilsartan Medoxomil 80 MG TABS Take 80 mg by mouth daily    Historical Provider, MD   fluticasone (FLONASE) 50 MCG/ACT nasal spray 1 spray by Each Nostril route daily 21   Ritu Platt MD   albuterol sulfate  (90 Base) MCG/ACT inhaler as needed  Patient not taking: No sig reported 5/3/21   Historical Provider, MD   Probiotic Product (PROBIOTIC-10 PO) Take 1 tablet by mouth daily    Historical Provider, MD   atorvastatin (LIPITOR) 20 MG tablet TAKE 1 TABLET BY MOUTH EVERY DAY 18   Historical Provider, MD   vitamin D (ERGOCALCIFEROL) 19289 UNITS CAPS capsule Take 5,000 Units by mouth daily    Historical Provider, MD   aspirin (ASPIRIN CHILDRENS) 81 MG chewable tablet Take 1 tablet by mouth daily.  10/13/14   Winston Gloria MD   metoprolol succinate (TOPROL XL) 25 MG extended release tablet Take 25 mg by mouth 2 times daily     Historical Provider, MD       Current medications:    Current Outpatient Medications   Medication Sig Dispense Refill    losartan (COZAAR) 25 MG tablet Take 0.5 tablets by mouth daily 45 tablet 3    cyanocobalamin 1000 MCG/ML injection INJECT 1ML ONCE A MONTH AS DIRECTED      Azilsartan Medoxomil 80 MG TABS Take 80 mg by mouth daily      fluticasone (FLONASE) 50 MCG/ACT nasal spray 1 spray by Each Nostril route daily 1 Bottle 5    albuterol sulfate  (90 Base) MCG/ACT inhaler as needed (Patient not taking: No sig reported)      Probiotic Product (PROBIOTIC-10 PO) Take 1 tablet by mouth daily      atorvastatin (LIPITOR) 20 MG tablet TAKE 1 TABLET BY MOUTH EVERY DAY  2    vitamin D (ERGOCALCIFEROL) 60734 UNITS CAPS capsule Take 5,000 Units by mouth daily      aspirin (ASPIRIN CHILDRENS) 81 MG chewable tablet Take 1 tablet by mouth daily. 30 tablet 3    metoprolol succinate (TOPROL XL) 25 MG extended release tablet Take 25 mg by mouth 2 times daily        No current facility-administered medications for this encounter.        Allergies:  No Known Allergies    Problem List:    Patient Active Problem List   Diagnosis Code    Coronary artery disease I25.10    Sleep apnea G47.30    BPH (benign prostatic hyperplasia) N40.0    Hyperlipidemia E78.5    Hypertension I10    Generalized abdominal pain R10.84    Obstruction of intestine K56.609    Cholelithiasis K80.20    Small bowel obstruction (HCC) K56.609    Bowel obstruction (HCC) K56.609    Shortness of breath R06.02    Complete AV block (HCC) I44.2    AV block, 3rd degree (HCC) I44.2    Cardiac pacemaker in situ Z95.0    Mild intermittent asthma without complication F68.58       Past Medical History:        Diagnosis Date    Arthritis     Asthma     BPH (benign prostatic hyperplasia)     Bradycardia 10/25/2021    Emergent TVP placed    Coronary artery disease     Diverticulitis of colon     History of blood transfusion     Hyperlipidemia     Hypertension     Intestinal ulcer     PUD (peptic ulcer disease)     Sleep apnea     Possible       Past Surgical History:        Procedure Laterality Date    CORONARY ANGIOPLASTY  07/13/2009    DIAGNOSTIC CARDIAC CATH LAB PROCEDURE  07/13/2009    NOSE SURGERY      PACEMAKER INSERTION Left 10/26/2021    Medtronic Dual Pacemaker Insertion by Dr. Sailaja Rosario         Social History:    Social History     Tobacco Use    Smoking status: Former Packs/day: 1.00     Years: 35.00     Pack years: 35.00     Types: Cigarettes     Start date: 56     Quit date:      Years since quittin.2    Smokeless tobacco: Never   Substance Use Topics    Alcohol use: Yes     Comment: occasionally                                Counseling given: Not Answered      Vital Signs (Current):   Vitals:    03/10/23 0941   BP: (!) 148/89   Pulse: 77   Resp: 18   Temp: 36.7 °C (98 °F)   TempSrc: Temporal   SpO2: 94%   Weight: 253 lb (114.8 kg)   Height: 6' 2\" (1.88 m)                                              BP Readings from Last 3 Encounters:   03/10/23 (!) 148/89   23 (!) 146/82   22 132/82       NPO Status:                           >12 hrs                                                      BMI:   Wt Readings from Last 3 Encounters:   03/10/23 253 lb (114.8 kg)   23 256 lb (116.1 kg)   22 256 lb (116.1 kg)     Body mass index is 32.48 kg/m². CBC:   Lab Results   Component Value Date/Time    WBC 8.7 03/10/2023 09:40 AM    RBC 5.15 03/10/2023 09:40 AM    HGB 15.6 03/10/2023 09:40 AM    HCT 44.1 03/10/2023 09:40 AM    MCV 85.6 03/10/2023 09:40 AM    RDW 14.0 03/10/2023 09:40 AM     03/10/2023 09:40 AM       CMP:   Lab Results   Component Value Date/Time     03/10/2023 09:40 AM    K 4.1 03/10/2023 09:40 AM     03/10/2023 09:40 AM    CO2 25 03/10/2023 09:40 AM    BUN 14 03/10/2023 09:40 AM    CREATININE 1.1 03/10/2023 09:40 AM    GFRAA >60 2021 10:25 AM    LABGLOM >60 03/10/2023 09:40 AM    GLUCOSE 140 03/10/2023 09:40 AM    GLUCOSE 147 2011 01:56 PM    PROT 7.6 2021 10:25 AM    CALCIUM 8.7 03/10/2023 09:40 AM    BILITOT 1.4 2021 10:25 AM    ALKPHOS 79 2021 10:25 AM    AST 22 2021 10:25 AM    ALT 20 2021 10:25 AM       POC Tests: No results for input(s): POCGLU, POCNA, POCK, POCCL, POCBUN, POCHEMO, POCHCT in the last 72 hours.     Coags:   Lab Results   Component Value Date/Time PROTIME 12.2 10/25/2021 01:34 PM    INR 1.1 10/25/2021 01:34 PM    APTT 29.1 10/25/2021 01:34 PM       HCG (If Applicable): No results found for: PREGTESTUR, PREGSERUM, HCG, HCGQUANT     ABGs: No results found for: PHART, PO2ART, KLY9VMW, WZC4TDN, BEART, B6RJWKCS     Type & Screen (If Applicable):  No results found for: LABABO, LABRH    Drug/Infectious Status (If Applicable):  No results found for: HIV, HEPCAB    COVID-19 Screening (If Applicable):   Lab Results   Component Value Date/Time    COVID19 Not Detected 10/25/2021 04:00 PM           Anesthesia Evaluation  Patient summary reviewed and Nursing notes reviewed no history of anesthetic complications:   Airway: Mallampati: III  TM distance: >3 FB   Neck ROM: full  Mouth opening: > = 3 FB   Dental: normal exam         Pulmonary:normal exam  breath sounds clear to auscultation  (+) shortness of breath: no interval change and chronic,  sleep apnea:  asthma (inhaler prn- once every few wks):     (-) not a current smoker (quit 1995)                           Cardiovascular:  Exercise tolerance: poor (<4 METS),   (+) hypertension:, pacemaker: pacemaker, CAD (h/o angioplasty, vein x1): obstructive, dysrhythmias (3rd degree AVB):, HAWKINS: after ambulating 1 flight of stairs, hyperlipidemia        Rhythm: regular  Rate: normal      Cleared by cardiology             PE comment: AR   Neuro/Psych:   Negative Neuro/Psych ROS              GI/Hepatic/Renal:   (+) PUD,          ROS comment: Cholelithiasis  BPH  H/O SBO, s/p sm bowel resection  diverticulitis. Endo/Other:    (+) : arthritis:., .          Pt had no PAT visit        ROS comment: R TKA Abdominal:   (+) obese,     Abdomen: soft. Vascular: negative vascular ROS. Other Findings:           Anesthesia Plan      MAC     ASA 4       Induction: intravenous. Anesthetic plan and risks discussed with patient and spouse. Use of blood products discussed with patient and spouse whom.    Plan discussed with attending.                     Alfonso Ortega, APRN - CRNA   3/10/2023

## 2023-03-10 NOTE — H&P
History & Physical      PCP: Silke Cary APRN - CNP    Date of Admission: 3/10/2023    Date of Service: Pt seen/examined on 3/10/2023 and is admitted to Inpatient with expected LOS greater than two midnights due to medical therapy. Placed in Observation. Chief Complaint:  had no chief complaint listed for this encounter. History Of Present Illness:    Mr. Rachel Bautista, a 80y.o. year old male  who  has a past medical history of Arthritis, Asthma, BPH (benign prostatic hyperplasia), Bradycardia, Coronary artery disease, Diverticulitis of colon, History of blood transfusion, Hyperlipidemia, Hypertension, Intestinal ulcer, PUD (peptic ulcer disease), and Sleep apnea. Patient was admitted in the hospital for CRT-P upgrade. Status post procedure by EP. No complications. Patient seen at bedside. Only endorses pain at [facility]. No fever chills shortness of breath chest pain abdominal pain dysuria or leg swelling. Monitor overnight and discharge tomorrow if no complaints. Past Medical History:        Diagnosis Date    Arthritis     Asthma     BPH (benign prostatic hyperplasia)     Bradycardia 10/25/2021    Emergent TVP placed    Coronary artery disease     Diverticulitis of colon     History of blood transfusion     Hyperlipidemia     Hypertension     Intestinal ulcer     PUD (peptic ulcer disease)     Sleep apnea     Possible       Past Surgical History:        Procedure Laterality Date    CORONARY ANGIOPLASTY  07/13/2009    DIAGNOSTIC CARDIAC CATH LAB PROCEDURE  07/13/2009    NOSE SURGERY      PACEMAKER INSERTION Left 10/26/2021    Medtronic Dual Pacemaker Insertion by Dr. Ronald Traylor         Medications Prior to Admission:      Prior to Admission medications    Medication Sig Start Date End Date Taking?  Authorizing Provider   losartan (COZAAR) 25 MG tablet Take 12.5 mg by mouth daily   Yes Historical Provider, MD   cyanocobalamin 1000 MCG/ML injection INJECT 1ML ONCE A MONTH AS DIRECTED 6/22/22   Historical Provider, MD   Azilsartan Medoxomil 80 MG TABS Take 80 mg by mouth daily    Historical Provider, MD   fluticasone (FLONASE) 50 MCG/ACT nasal spray 1 spray by Each Nostril route daily 8/19/21   Norman Hunt MD   albuterol sulfate  (90 Base) MCG/ACT inhaler as needed 5/3/21   Historical Provider, MD   Probiotic Product (PROBIOTIC-10 PO) Take 1 tablet by mouth daily    Historical Provider, MD   atorvastatin (LIPITOR) 20 MG tablet at bedtime 9/17/18   Historical Provider, MD   vitamin D (ERGOCALCIFEROL) 98506 UNITS CAPS capsule Take 5,000 Units by mouth daily    Historical Provider, MD   aspirin (ASPIRIN CHILDRENS) 81 MG chewable tablet Take 1 tablet by mouth daily. Patient taking differently: Take 81 mg by mouth at bedtime 10/13/14   Annalee Alvares MD   metoprolol succinate (TOPROL XL) 25 MG extended release tablet Take 25 mg by mouth 2 times daily     Historical Provider, MD       Allergies:  Patient has no known allergies. Social History:    TOBACCO:   reports that he quit smoking about 28 years ago. His smoking use included cigarettes. He started smoking about 63 years ago. He has a 35.00 pack-year smoking history. He has never used smokeless tobacco.  ETOH:   reports current alcohol use. Family History:    Reviewed in detail and negative for DM, CAD, Cancer, CVA. Positive as follows\"      Problem Relation Age of Onset    Heart Attack Father        REVIEW OF SYSTEMS:   Pertinent positives as noted in the HPI. All other systems reviewed and negative. PHYSICAL EXAM:  /71   Pulse 63   Temp 97.7 °F (36.5 °C) (Oral)   Resp 22   Ht 6' 2\" (1.88 m)   Wt 253 lb (114.8 kg)   SpO2 93%   BMI 32.48 kg/m²   General appearance: No apparent distress, appears stated age and cooperative. HEENT: Normal cephalic, atraumatic without obvious deformity. Pupils equal, round, and reactive to light. Extra ocular muscles intact.  Conjunctivae/corneas clear.  Neck: Supple, with full range of motion. No jugular venous distention. Trachea midline. Respiratory:    Cardiovascular:    Abdomen:    Musculoskeletal: No clubbing, cyanosis, edema of bilateral lower extremities. Brisk capillary refill. Skin: Normal skin color. No rashes or lesions. Neurologic:  Neurovascularly intact without any focal sensory/motor deficits. Cranial nerves: II-XII intact, grossly non-focal.  Psychiatric: Alert and oriented, thought content appropriate, normal insight    Reviewed EKG and CXR personally      CBC:   Recent Labs     03/10/23  0940   WBC 8.7   RBC 5.15   HGB 15.6   HCT 44.1   MCV 85.6   RDW 14.0        BMP:   Recent Labs     03/10/23  0940      K 4.1   *   CO2 25   BUN 14   CREATININE 1.1   MG 2.1     LFT:  No results for input(s): PROT, ALB, ALKPHOS, ALT, AST, BILITOT, AMYLASE, LIPASE in the last 72 hours. CE:  No results for input(s): Magdalene Mendoza in the last 72 hours. PT/INR: No results for input(s): INR, APTT in the last 72 hours. BNP: No results for input(s): BNP in the last 72 hours.   ESR: No results found for: SEDRATE  CRP: No results found for: CRP  D Dimer: No results found for: DDIMER   Folate and B12: No results found for: [id number], No results found for: FOLATE  Lactic Acid:   Lab Results   Component Value Date    LACTA 1.5 10/28/2017     Thyroid Studies:   Lab Results   Component Value Date    TSH 2.720 10/25/2021       Oupatient labs:  Lab Results   Component Value Date    CHOL 87 12/28/2021    TRIG 257 (H) 12/28/2021    HDL 24 12/28/2021    LDLCALC 12 12/28/2021    TSH 2.720 10/25/2021    INR 1.1 10/25/2021       Urinalysis:    Lab Results   Component Value Date/Time    NITRU Negative 09/04/2019 11:03 AM    WBCUA 2-5 09/04/2019 11:03 AM    WBCUA 0-1 07/28/2011 01:55 PM    BACTERIA RARE 09/04/2019 11:03 AM    RBCUA NONE 09/04/2019 11:03 AM    RBCUA 1-3 07/28/2011 01:55 PM    BLOODU Negative 09/04/2019 11:03 AM    SPECGRAV 1.025 09/04/2019 11:03 AM    GLUCOSEU Negative 09/04/2019 11:03 AM    GLUCOSEU NEGATIVE 07/28/2011 01:55 PM       Imaging:  XR CHEST PORTABLE    Result Date: 3/10/2023  EXAMINATION: ONE XRAY VIEW OF THE CHEST 3/10/2023 12:13 pm COMPARISON: 26 October 2021 HISTORY: ORDERING SYSTEM PROVIDED HISTORY: post lv lead placement TECHNOLOGIST PROVIDED HISTORY: Reason for exam:->post lv lead placement What reading provider will be dictating this exam?->CRC FINDINGS: Stable left-sided pacemaker. Stable likely atelectasis or scarring at the left lung base. Normal heart and pulmonary vascularity. Neither costophrenic angle is blunted. Stable left-sided scarring and or atelectasis. No new abnormal findings. ASSESSMENT:  Third-degree heart block status post pacemaker placement  Nonischemic cardiomyopathy  Essential hypertension  Hyperlipidemia      PLAN:  Admit patient on observation  Status post CRT-P upgrade. Tolerated procedure well without any complications  Continue cardiac medications  EP follow-up on discharge  Likely discharge if stable overnight. Diet: ADULT DIET;  Regular; Low Fat/Low Chol/High Fiber/2 gm Na  Code Status: Full Code  Surrogate decision maker confirmed with patient:   Extended Emergency Contact Information  Primary Emergency Contact: Roma Mcguire RACHANA  Address: [address] Phone: [phone]  Mobile Phone: [phone]  Relation: Spouse  Secondary Emergency Contact: Renard Velasquez  Address: [address] [place] [address] Phone: [phone]  Relation: Child      DVT Prophylaxis: []Lovenox []Heparin []PCD [] [address] []Encouraged ambulation  Disposition: []Med/Surg [] Intermediate [] ICU/CCU  Admit status: [] Observation [] Inpatient     +++++++++++++++++++++++++++++++++++++++++++++++++  Mario Llanes MD    [address] [facility], [place]  +++++++++++++++++++++++++++++++++++++++++++++++++  NOTE: This report was transcribed using voice recognition software. Every effort was made to ensure accuracy; however, inadvertent computerized transcription errors may be present.

## 2023-03-10 NOTE — DISCHARGE INSTRUCTIONS
[facility] Pacemaker Instructions    Incision Care:  Leave brown Aquacel dressing on for 1 week. Remove aquacel dressing on Friday 3/17/23. Do not remove the steri strips from your incision. They will be removed at your follow up appointment. Keep the incision dry. You may shower; but do not let the water run directly on the incision for 1 week. Check the area daily. Notify the office at [phone] if you develop any redness, swelling, drainage, warmth, or fever greater than 100 degrees. Activity:  You may continue regular activity; but limit strenuous or stretching movements of the arm closest to your pacemaker. Wear the arm immobilizer at all times for 48 hours and then at night for 4 weeks. Avoid pulling yourself up with that arm. Do not raise that elbow higher than shoulder height and do not lift anything weighing more than 2 pounds for 4 weeks. Do not do any activities such as golfing, vacuuming, or mowing the lawn for 4 weeks. Prevent any hard blows to the pacemaker site. Driving: You may drive, if you feel up to it, in 14 days or after your 2 week follow up visit. Start with local/short trips to familiar places. Avoid highway/ high-speed driving for the first few days after you resume driving. DO NOT drive until you have stopped taking prescription pain medications. Special Instructions:  Let your dentist, doctor, or medical specialist know that you have a pacemaker  so precautions can be taken to protect the device. Your device is considered to be MRI conditional; meaning that under certain circumstances, MRI exams may be performed after 6 weeks post implantation  Your pacemaker may set off a metal detector, such as those used in airports and courtrooms. Let security know that you have a pacemaker & show them your card. Remote Monitor:   Many of these monitors have a delay of 3-6 months currently, in some cases an latisha can be used with newer smartphones to provide monitoring services. Please discuss this with the device clinic at your follow up appointment in 2 weeks. ID Card: You will have a temporary ID card until a permanent card is sent to you by the device company. The permanent card will look like a 's license or credit card and should arrive within 8 weeks. Carry your ID card with you at all times        Follow Up: You will be seen on 3/24/23 at 8:00 am at the [address] [place] [address] for incision check and brief pacemaker evaluation. If you need to reschedule this appointment, call the office at [phone]. [facility]    [address] [place] [address]    [phone]

## 2023-03-10 NOTE — PLAN OF CARE
Problem: Discharge Planning  Goal: Discharge to home or other facility with appropriate resources  Outcome: Progressing  Flowsheets  Taken 3/10/2023 1344  Discharge to home or other facility with appropriate resources: Identify barriers to discharge with patient and caregiver  Taken 3/10/2023 1331  Discharge to home or other facility with appropriate resources: Identify barriers to discharge with patient and caregiver     Problem: Safety - Adult  Goal: Free from fall injury  Outcome: Progressing     Problem: ABCDS Injury Assessment  Goal: Absence of physical injury  Outcome: Progressing

## 2023-03-10 NOTE — H&P
[address] [facility],[address] and [facility] [place] [facility]  History and Physical Examination  PATIENT: Han Sanchez  MEDICAL RECORD NUMBER: [id number]  DATE OF SERVICE:  3/10/2023  ELECTROPHYSIOLOGIST: Rojas Mario MD   REFERRING PHYSICIAN:  SABINA Ferrari CNP  CHIEF COMPLAINT: Pacemaker in situ    HPI: This is a 80 y.o. male with a history of   Patient Active Problem List   Diagnosis    Coronary artery disease    Sleep apnea    BPH (benign prostatic hyperplasia)    Hyperlipidemia    Hypertension    Generalized abdominal pain    Obstruction of intestine    Cholelithiasis    Small bowel obstruction (HCC)    Bowel obstruction (HCC)    Shortness of breath    Complete AV block (HCC)    AV block, 3rd degree ([address]    Cardiac pacemaker in situ    Mild intermittent asthma without complication   Initial consult 10/25/21: The patient presents to the hospital complaining of generalized weakness and slow heart rate. The patient has history of coronary artery disease status post PCI of RCA on 7/13/2009, chronic bifascicular block, hypertension, hyperlipidemia, obstructive sleep apnea and obesity. The patient states that he has been diagnosed with chronic bronchitis for the past 3 months. He had been treated with antibiotic in the past and is currently using only inhalers. He reports occasional lightheadedness on and off and He reports today he felt weak and when he checked his pulse using his pulse oximetry, it showed that his HR was 35 bpm so he decided to come to ED. He denies any chest pain, dyspnea, palpitation, syncope, PND or orthopnea. In ED he was noted to be in normal sinus rhythm with complete AV block with escapes in RBBB and LAFB at 35 bpm. He was given IV Glucagon. He is currently taking ToproL XL mg bid for his CAD and last dose of ToproL XL was 10/24/21 at 10.00 PM. Cardiac electrophysiology service is consulted for complete AV block.     10/26/21: The patient is seen in the hospital for follow up. He denies any chest pain, dyspnea or palpitations. He remians in complete AV block with V paced at 60 bpm with PVCs. There were evidence of lost of capture noted intermittently at 1.00 AM and 7.00 AM suspected from positioning of the patient. Current Threshold was 0.5 mA and the output is setting at 5 mA. 10/27/21: The patient is seen in the hospital for follow-up, he has no cardiac complaints. He underwent placement of a dual chamber pacemaker on 10/26/21 today the Aquacel dressing is clean dry and intact without edema, ecchymosis or erythema, he has no complaints of pain at the device site and the device has normal function. 10/11/22: The patient is seen in cardiac electrophysiology clinic for follow up. The patient currently feels well but does reports SOB. He offers no complaints from a device POV. The device site looks well healed and free from infection or erosion. He reports shortness of breath with exertion. The patient denies any chest pain, palpitations, dizziness, syncope, orthopnea or paroxysmal nocturnal dyspnea. The patient continues to be followed remotely. 3/10/23: The patient is seen in the hospital for CRT-P upgrade. The patient reports HAWKINS. Echo 1/11/23 showed LV EF of 50%. Nuclear stress test on 1/11/23 showed apical inferior scar and LV EF 32%. Cardiac MRI at [facility] on 1/13/23 showed LV EF of 41% and no definite scar noted. Risks, benefits, and alternatives of CRT-P upgrade were discussed in detail today. These risks include but are not limited to bleeding, infection, blood clot, pneumothorax, hemothorax,cardiac valve damage, cardiac perforation and tamponade required emergent thoracotomy, contrast induced nephropathy leading to short or even long term dialysis, vascular injury requiring emergent surgical repair, lead dislodgement, stroke and even death. The patient understands these risks and agrees to proceed with the procedure.     Patient Active Problem List    Diagnosis Date Noted    Mild intermittent asthma without complication     Cardiac pacemaker in situ     AV block, 3rd degree ([facility] [address] 10/25/2021    Complete AV block ([facility] [address]     Shortness of breath 2021    Small bowel obstruction ([facility] [address] 2019    Bowel obstruction ([facility] [address] 2019    Cholelithiasis 2016    Obstruction of intestine 2016    Generalized abdominal pain 2016    Coronary artery disease      Overview Note:     Cardiac catheterization (09): Nondominant right coronary artery but with a large posterolateral branch supplying a portion of the inferior wall with 70% stenosis in the mid right coronary artery. Stenting of mid RCA with 3.5 x 12 mm  non drug-eluting stent.        Sleep apnea     BPH (benign prostatic hyperplasia)     Hyperlipidemia     Hypertension        Past Medical History:   Diagnosis Date    BPH (benign prostatic hyperplasia)     Bradycardia 10/25/2021    Emergent TVP placed    Coronary artery disease     Diverticulitis of colon     Hyperlipidemia     Hypertension     Intestinal ulcer     PUD (peptic ulcer disease)     Sleep apnea     Possible       Family History   Problem Relation Age of Onset    Heart Attack Father        Social History     Tobacco Use    Smoking status: Former     Packs/day: 1.00     Years: 35.00     Pack years: 35.00     Types: Cigarettes     Start date: 56     Quit date:      Years since quittin.2    Smokeless tobacco: Never   Substance Use Topics    Alcohol use: Yes     Comment: occasionally       Current Outpatient Medications   Medication Sig Dispense Refill    losartan (COZAAR) 25 MG tablet Take 0.5 tablets by mouth daily 45 tablet 3    Ascorbic Acid (VITAMIN C) 250 MG tablet Take 250 mg by mouth daily (Patient not taking: Reported on 2023)      cyanocobalamin 1000 MCG/ML injection INJECT 1ML ONCE A MONTH AS DIRECTED      Azilsartan Medoxomil 80 MG TABS Take 80 mg by mouth daily      fluticasone (FLONASE) 50 MCG/ACT nasal spray 1 spray by Each Nostril route daily 1 Bottle 5    albuterol sulfate  (90 Base) MCG/ACT inhaler as needed (Patient not taking: No sig reported)      Probiotic Product (PROBIOTIC-10 PO) Take 1 tablet by mouth daily      atorvastatin (LIPITOR) 20 MG tablet TAKE 1 TABLET BY MOUTH EVERY DAY  2    vitamin D (ERGOCALCIFEROL) 59317 UNITS CAPS capsule Take 5,000 Units by mouth daily      aspirin (ASPIRIN CHILDRENS) 81 MG chewable tablet Take 1 tablet by mouth daily. 30 tablet 3    metoprolol succinate (TOPROL XL) 25 MG extended release tablet Take 25 mg by mouth 2 times daily        No current facility-administered medications for this encounter. No Known Allergies    ROS:   Constitutional: Negative for fever,  activity change and negative for appetite change. HENT: Negative for epistaxis. Eyes: Negative for diploplia, blurred vision. Respiratory: Negative for cough and chest tightness. Positive for shortness of breath and negative for wheezing. Cardiovascular: pertinent positives in HPI  Gastrointestinal: Negative for abdominal pain and blood in stool. All other review of systems are negative     PHYSICAL EXAM:   There were no vitals filed for this visit. Constitutional: Well-developed, no acute distress  Eyes: conjunctivae normal, no xanthelasma   Ears, Nose, Throat: oral mucosa moist, no cyanosis   CV: no JVD. Regular rate. No murmurs, rubs, or gallops. PMI is nondisplaced  Lungs: clear to auscultation bilaterally, normal respiratory effort without used of accessory muscles  Abdomen: soft, non-tender, bowel sounds present, no masses or hepatomegaly   Musculoskeletal: no digital clubbing, trace edema of LEs  Skin: warm, no rashes   Device site: well healed.  No erosion, infection or migration    I have personally reviewed the laboratory, cardiac diagnostic and radiographic testing as outlined below:    Data:    No results for input(s): WBC, HGB, HCT, PLT in the last 72 hours. No results for input(s): NA, K, CL, CO2, BUN, CREATININE, GLU, CALCIUM in the last 72 hours. Invalid input(s): MAGNESIUM     Lab Results   Component Value Date/Time    MG 2.0 10/26/2021 03:37 PM     No results for input(s): TSH in the last 72 hours. No results for input(s): INR in the last 72 hours. EKG 03/10/23 : Sinus rhythm with A sensed V paced rate 71 bpm  Please see scan in Cardiology. Cardiac MRI 1/13/23:      Echocardiogram 12/14/21:   Findings      Left Ventricle   Septal motion consistent with conduction abnormality . Low normal left ventricular systolic function. Stage I diastolic dysfunction. Ejection fraction is visually estimated at 50%. Right Ventricle   Normal right ventricle structure and function. Left Atrium   Normal sized left atrium. Right Atrium   Normal right atrium size. Mitral Valve   Structurally normal mitral valve. Physiologic mitral regurgitation. Tricuspid Valve   Trace tricuspid regurgitation. RVSP is 29 mmHg. Aortic Valve   Trileaflet aortic valve   No hemodynamically significant aortic stenosis is present. No evidence of aortic valve regurgitation      Pulmonic Valve   Mild pulmonic regurgitation. Pericardial Effusion   No evidence of pericardial effusion. Aorta   Aortic root dimension within normal limits. Miscellaneous   The inferior vena cava diameter is normal with normal respiratory   variation. Conclusions      Summary   Low normal left ventricular systolic function. Stage I diastolic dysfunction. Physiologic mitral regurgitation.       Signature      ----------------------------------------------------------------   Electronically signed by Terry Busch DO (Interpreting   physician) on 12/15/2022 05:19 PM   ----------------------------------------------------------------     Echocardiogram 6/16/21:       Left Ventricle   Normal left ventricle size and systolic function   Stage I diastolic dysfunction   No wall motion abnormalities   Ejection fraction is visually estimated at 60%. Right Ventricle   Normal right ventricle structure and function. Left Atrium   Normal sized left atrium. Right Atrium   Normal right atrium size. Mitral Valve   Structurally normal mitral valve. No mitral regurgitation. No mitral stenosis. Tricuspid Valve   Trace tricuspid regurgitation. RVSP is 25 mmHg. Aortic Valve   Trileaflet aortic valve   No hemodynamically significant aortic stenosis is present. No evidence of aortic valve regurgitation      Pulmonic Valve   Mild to moderate pulmonic regurgitation. Pericardial Effusion   No evidence of pericardial effusion. Aorta   Aortic root dimension within normal limits. Miscellaneous   The inferior vena cava diameter is normal with normal respiratory   variation. Conclusions      Summary   Normal left ventricle size and systolic function   Stage I diastolic dysfunction      Signature      ----------------------------------------------------------------   Electronically signed by Jan Whittington DO (Interpreting   physician) on 06/17/2021 05:19 PM   ----------------------------------------------------------------    Cardiac Catheterization 7/13/09:   - Nondominant right coronary artery but with a large posterolateral branch supplying a portion of the inferior wall with 70% stenosis in the mid right coronary artery. - Stenting of mid RCA with 3.5 x 12 mm  non drug-eluting stent. Stress test 1/11/23:  FINDINGS: The overall quality of the study was good. Left ventricular cavity size was noted to be enlarged on both rest and stress studies. Rotational analog analysis demonstrated no patient motion or abnormal extracardiac radioactivity. A severe defect was present in the apical inferior wall(s) that was  large sized by quantification. The resting images show no change.       Gated SPECT left ventricular ejection fraction was calculated to be 32%, with akinesis of the apical inferior wall. Impression:    ECG during the infusion did not change. The myocardial perfusion imaging was abnormal. The abnormality was a a large sized fixed defect in the apical inferior wall suggestive of a prior MI. Overall left ventricular systolic function was abnormal with regional wall motion abnormalities. High risk general pharmacologic stress test due to LV dysfunction. Thank you for sending your patient to this [facility]. Electronically signed by More Copeland MD on 1/11/23 at 1:47 PM EST               Stress Test 3/7/18:   FINDINGS: The overall quality of the study was good. Left ventricular cavity size was noted to be normal.     Rotational analog analysis demonstrated no patient motion. The gated SPECT stress imaging in the short, vertical long, and   horizontal long axis demonstrated normal homogeneous tracer   distribution throughout the myocardium. A moderate defect was present in the inferior wall that was large   sized by quantification. The resting images show no change. Gated SPECT left ventricular ejection fraction was calculated to   be 58% with normal myocardial thickening and wall motion. Impression:     1. ECG during the infusion did not change. 2. Myocardial perfusion imaging demonstrated a large sized fixed   defect in the inferior wall suggestive of a prior MI   3. Overall left ventricular systolic function was normal without   regional wall motion abnormalities. 4. Low risk general pharmacologic stress test.     Thank you for sending your patient to this [facility].      Device Interrogation 10/11/22:  Make/Model Medtronic [address]  Mode DDDR 60/120  P wave: 2.6 mV  Impedance: 494 ohms   Threshold: 1.0 V @ 0.4 ms  RV R wave: 7.9 mV  Impedance: 380 ohms   Threshold: 0.5 V @ 0.4 ms  Pacing: A: 34.1% RV: 98.7%   Battery Voltage/Longevity: 10.4 years  Arrhythmias: none   Reprogramming included none   Overall device function is normal  All device programmable settings were evaluated per above and in the scanned document, along with iterative adjustments (capture thresholds) to assess and select the most appropriate final programming to provide for consistent delivery of the appropriate therapy and to verify function of the device. I have independently reviewed all of the ECGs and rhythm strips per above     Assessment/Plan: This is a 80 y.o. male with a history of   Patient Active Problem List   Diagnosis    Coronary artery disease    Sleep apnea    BPH (benign prostatic hyperplasia)    Hyperlipidemia    Hypertension    Generalized abdominal pain    Obstruction of intestine    Cholelithiasis    Small bowel obstruction (HCC)    Bowel obstruction (HCC)    Shortness of breath    Complete AV block (HCC)    AV block, 3rd degree (HCC)    Cardiac pacemaker in situ    Mild intermittent asthma without complication      1. Pacemaker in situ   - DOI 10/26/21  - Medtronic; dual chamber  - Normal device functon. - 98.7% RV pacing. 2. Nonischemic cardiomyopathy and chronic HFmrEF  - NYHA class III, ACC/AHA stage C.  - Echo 1/11/23 showed LV EF of 50%. - Nuclear stress test on 1/11/23 showed apical inferior scar and LV EF 32%. - Cardiac MRI at [facility] on 1/13/23 showed LV EF of 41% and no definite scar noted. 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  - Risks, benefits, and alternatives of CRT-P upgrade were discussed in detail today. These risks include but are not limited to bleeding, infection, blood clot, pneumothorax, hemothorax,cardiac valve damage, cardiac perforation and tamponade required emergent thoracotomy, contrast induced nephropathy leading to short or even long term dialysis, vascular injury requiring emergent surgical repair, lead dislodgement, stroke and even death.  The patient understands these risks and agrees to proceed with the procedure. 3 Complete AV block  - Underlying RBBB and LAFB since 1/3/2012 per EKG. - Status post pacemaker implantation. 4. Chronic bifascicular block  - Diagnosed with RBBB and LAFB since 1/3/2012. 5. Coronary artery disease   - [facility] 7/13/2009 showed nondominant right coronary artery but with a large posterolateral branch supplying a portion of the inferior wall with 70% stenosis in the mid right coronary artery. - Status post PCI of RCA on 7/13/2009. - On ASA, Toprol XL and Lipitor. 6. Hypertension  - On Edarbi and Toprol XL. 7. Hyperlipidemia  - On Lipitor    8. Obstructive sleep apnea     9. Obesity  -There is no height or weight on file to calculate BMI. 10. Chronic bronchitis   - On Flonase and Albuterol. Recommendations:  1. Will proceed with CRT-P upgrade with vascular standby. 2. Follow up after the procedure. Encouraged the patient to call the office for any questions or concerns. Thank you for allowing me to participate in your patient's care. Please call me if there are any questions or concerns.       Kateryna Barakat MD  Cardiac Electrophysiology  [address]   [facility] [place]: [place] [facility]  7:44 AM  3/10/2023

## 2023-03-10 NOTE — ANESTHESIA POSTPROCEDURE EVALUATION
Department of Anesthesiology  Postprocedure Note    Patient: Miriam Yuan  MRN: [id number]  YOB: 1940  Date of evaluation: 3/10/2023      Procedure Summary     Date: 03/10/23 Room / Location: [facility] [place] CATH LAB    Anesthesia Start: 1100 Anesthesia Stop: 1011    Procedure: BI-V PERM PACEMAKER W ANES Diagnosis:       Other forms of dyspnea      Heart disease, unspecified      S/P placement of cardiac pacemaker    Scheduled Providers: Leopold Bailiff, APRN - CRNA; Kary Myrick MD Responsible Provider: Kary Myrick MD    Anesthesia Type: MAC ASA Status: 4          Anesthesia Type: No value filed.     Whitney Phase I:      Whitney Phase II:        Anesthesia Post Evaluation    Patient location during evaluation: PACU  Patient participation: complete - patient participated  Level of consciousness: awake  Pain score: 0  Airway patency: patent  Nausea & Vomiting: no nausea  Complications: no  Cardiovascular status: hemodynamically stable  Respiratory status: acceptable  Hydration status: stable  Multimodal analgesia pain management approach

## 2023-03-10 NOTE — DISCHARGE SUMMARY
[address] [place] Electrophysiology  Discharge Summary  Patient: Darya Pinedo  Medical Record Number: [id number]  Date of Procedure:  3/10/2023  Operating Electrophysiologist: Damien Gutierrez MD  Primary Electrophysiologist: Damien Gutierrez MD  Referring Physician: SABINA Finn - CNP     Admission Date:3/10/2023      Discharge Date:  3/11/2023    Patient Active Problem List   Diagnosis    Coronary artery disease    Sleep apnea    BPH (benign prostatic hyperplasia)    Hyperlipidemia    Hypertension    Generalized abdominal pain    Obstruction of intestine    Cholelithiasis    Small bowel obstruction (HCC)    Bowel obstruction (HCC)    Shortness of breath    Complete AV block (HCC)    AV block, 3rd degree ([address]    Cardiac pacemaker in situ    Mild intermittent asthma without complication    S/P placement of cardiac pacemaker    Nonischemic cardiomyopathy ([address]        Medication List        CONTINUE taking these medications      albuterol sulfate  (90 Base) MCG/ACT inhaler  Commonly known as: PROVENTIL;VENTOLIN;PROAIR     aspirin 81 MG chewable tablet  Commonly known as: Aspirin Childrens  Take 1 tablet by mouth daily. atorvastatin 20 MG tablet  Commonly known as: LIPITOR     Azilsartan Medoxomil 80 MG Tabs     cyanocobalamin 1000 MCG/ML injection     fluticasone 50 MCG/ACT nasal spray  Commonly known as: FLONASE  1 spray by Each Nostril route daily     metoprolol succinate 25 MG extended release tablet  Commonly known as: TOPROL XL     PROBIOTIC-10 PO     vitamin D 1.25 MG ([address] UT) Caps capsule  Commonly known as: ERGOCALCIFEROL            STOP taking these medications      losartan 25 MG tablet  Commonly known as: [id number] Course: The patient underwent successful CRT-P upgrade on 3/10/23. Procedures Performed:   Procedure Performed:  1. Placement of bi-ventricular pacemaker pulse generator (Medtronic)  2.  Removal of dual chamber pacemaker pulse generator ( Medtronic)   3. Insertion of the new left ventricular pacemaker lead  4. Coronary Sinus venography  5. Left upper extremity venography  6. Fluoroscopy    Consultants: None    Disposition: The patient was discharged to home in stable condition on the above medications. Clinical follow-up in the office in device clinic in 2 weeks.       Evans Chong MD  Cardiac Electrophysiology  [address]  [facility] [place]: [place] [facility]  1:09 PM  3/10/2023

## 2023-03-10 NOTE — OP NOTE
[address] [facility]  Procedure Report  PATIENT: [facility] RECORD NUMBER: [id number]  DATE OF PROCEDURE:  3/10/2023  ATTENDING ELECTROPHYSIOLOGIST: Giovanni Leventhal, MD  REFERRING PHYSICIAN: Dr. La Llanes    Procedure Performed:  1. Placement of bi-ventricular pacemaker pulse generator (Medtronic)  2. Removal of dual chamber pacemaker pulse generator ( Medtronic)   3. Insertion of the new left ventricular pacemaker lead  4. Coronary Sinus venography  5. Left upper extremity venography  6. Fluoroscopy    Indication for Procedure:  1. 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 2. Dual chamber pacemaker in situ    Flouroscopy: 7.6 min  Complications: none immediately apparent  EBL: minimal  Specimens: none  Contrast: 25 cc    FINDINGS:  Implanted device information:  1. New Pulse Generator is a Medtronic. Serial # [id number]  Placement: Left pectoral subcutaneous  Date of implant: 3/10/2023  2. Old  Pulse Generator is a Medtronic. Serial # [id number]  Placement: Left pectoral subcutaneous  Date of implant: 10/26/2021  Date of explant: 3/10/2023  3. Right atrial lead parameters are as follows:  Medtronic Model # [id number]. Serial # [id number]  Lead position: RA  Date of implant: 10/26/2021  P-waves: 2.1 mV  Pacing threshold: 1.0 V at 0.4 ms. Impedance: 475 ohms. 4. Right ventricular lead parameters are as follows:  Medtronic Model [id number]. Serial # [id number]  Lead position: RV  Date of implant: 10/26/2021  R-waves: Paced  Pacing threshold: 0.75 V at 0.4 ms. Impedance: 361 ohms. 5. Left ventricular lead parameters are as follows:  Medtronic Model # [id number]. Serial # [id number]  Lead position: CS  Date of implant: 3/10/2023  Pacing threshold: 0.75 V at 0.4 (LV4 to LV1) ms. Impedance: 855 ohms.   6. Bradycardia parameters:  Mode: DDDR  Base Rate: 60  AV delay: 130/100  Max Tracking Rate: 120    DETAILS OF THE OPERATION: The risks, benefits, alternatives of the procedure were explained to patient and family. They consented and agreed to proceed. Written consent was obtained in the chart. Blood products are not routinely needed for such procedures. The patient was brought to the electrophysiology laboratory in a fasting and non-sedated state. The patient had electrocardiographic and hemodynamic monitoring equipment placed. During the case the patient was under the care of an anesthesiologist/CRNA team for sedation and hemodynamic monitoring. A final time out was performed prior to beginning the procedure. The patient was prepped and draped in usual sterile fashion. A left subclavian venogram was performed using 10 cc of IV contrast and showed patent subclavian vein. Twenty mL of 2% lidocaine was used to anesthetize the left pectoral area. Using a plasma-blade an incision was made over the patient's previous incision. Using combination of scalpel and electrocautery, we dissected down to the device pocket. The old device and was then removed from the pocket and the leads were disconnected from the header of the device. The leads were visually inspected and lead parameters were checked and deemed to adequate. The patient's previous device pocket was then extended inferiorly and medially to approximate the size of the new device. Hemostasis was achieved with electrocautery and confirmed visually. Using a modified Seldinger technique and a fluroscopic guidance,a guide wire was placed in the left subclavian vein. Over the guide wire, a 9-Fr Safe-sheath was passed. Through this a Medtronic MPX coronary sinus sheath was passed and was advanced into the right atrial body. Through this, a Bard Decapolar CS catheter was advanced and used to cannulate the os of the coronary sinus.  Position was confirmed with radiographic iodinated contrast injection. The CS sheath was then advanced over the CS catheter. Through the CS sheath, a CS Balloon Venography catheter was advanced into the coronary sinus proper. Via this, coronary sinus venography was performed, and this image was used as a road map. The CS Balloon catheter was removed, and the ventricular lead was advanced through the CS sheath over a 0.014-inch guide-wire to a basolateral branch of the coronary sinus. The 0.014 inch guide wire was then pulled back, and lead parameters were checked and deemed to be adequate. There was no diaphragmatic stimulation at 10 V pacing. The CS sheath was removed and the LV lead was sewn to the pre-pectoral fascia using 0-Ethibond sutures, suturing over the suture sleeves. The device pocket was then irrigated with antibiotic vancomycin solution. The leads were then attached to the appropriate binding posts on the header of the new device. The set screws were then tightened with a torque wrench. Tug tests were performed on all leads to ensure they are adequately fixated. The device and the leads were then placed within the Tyrx antibiotic pouch and then were placed within the newly formed device pocket. Lead parameters were then rechecked via the device and deemed to be adequate. The pulse generator was tied to the pre-pectoral fascia using 0-Ethibond. Surgiflo was injected into the pocket to ensure hemostasis. The incision was closed with 3 layers of absorbable suture, Vicryl. The deepest of which was a 2-0 interrupted stitches followed by a 2nd layer of 3-0 running stitch performed perpendicular to the deep layer and, lastly, a 4-0 subcuticular stitch. The skin was then prepped with benzoin solution, Steri-Strips, and island dressing were then applied. This was followed by a pressure dressing.   At the end of the case, the patient was hemodynamically stable and under the care of the anesthesiologist, the patient was brought back to the recovery area for post procedural monitoring. ASSESSMENT:  1. Successful upgrade of a Medtronic dual chamber pacemaker to bi-ventricular pacemaker for cardiac resynchronization of congestive heart failure NYHA Class III. RECOMMENDATIONS:  1. Stat portable chest x-ray to rule out pneumothorax and check lead placement as well as in the morning. 2. EKG to be performed now. 3. Post-procedural monitoring in the recovery area. 4. Patient will be admitted for overnight observation on Telemetry. 5. The patient will receive 24-hours of lashonda-operative intravenous antibiotics. 6. The patient's device will be interrogated in the morning by a representative of the device   7. The patient is to follow-up in device clinic within 2 weeks upon discharge for a wound check  8. The patient is advised follow all post-operative device and wound care instructions as per the information provided in the pre-operative clinic.     Kateryna Barakat MD  Cardiac Electrophysiology  [address]   [facility]  11:11 AM  3/10/2023

## 2023-03-11 VITALS
OXYGEN SATURATION: 92 % | HEART RATE: 76 BPM | TEMPERATURE: 97.5 F | BODY MASS INDEX: 32.13 KG/M2 | DIASTOLIC BLOOD PRESSURE: 84 MMHG | RESPIRATION RATE: 16 BRPM | WEIGHT: 250.38 LBS | SYSTOLIC BLOOD PRESSURE: 174 MMHG | HEIGHT: 74 IN

## 2023-03-11 LAB
ANION GAP SERPL CALCULATED.3IONS-SCNC: 10 MMOL/L (ref 7–16)
BUN BLDV-MCNC: 13 MG/DL (ref 6–23)
CALCIUM SERPL-MCNC: 7.8 MG/DL (ref 8.6–10.2)
CHLORIDE BLD-SCNC: 107 MMOL/L (ref 98–107)
CO2: 23 MMOL/L (ref 22–29)
CREAT SERPL-MCNC: 1.1 MG/DL (ref 0.7–1.2)
GFR SERPL CREATININE-BSD FRML MDRD: >60 ML/MIN/1.73
GLUCOSE BLD-MCNC: 125 MG/DL (ref 74–99)
HCT VFR BLD CALC: 41.5 % (ref 37–54)
HEMOGLOBIN: 14.2 G/DL (ref 12.5–16.5)
MAGNESIUM: 2.1 MG/DL (ref 1.6–2.6)
MCH RBC QN AUTO: 29.9 PG (ref 26–35)
MCHC RBC AUTO-ENTMCNC: 34.2 % (ref 32–34.5)
MCV RBC AUTO: 87.4 FL (ref 80–99.9)
PDW BLD-RTO: 14.2 FL (ref 11.5–15)
PLATELET # BLD: 181 E9/L (ref 130–450)
PMV BLD AUTO: 8.7 FL (ref 7–12)
POTASSIUM SERPL-SCNC: 3.8 MMOL/L (ref 3.5–5)
RBC # BLD: 4.75 E12/L (ref 3.8–5.8)
SODIUM BLD-SCNC: 140 MMOL/L (ref 132–146)
WBC # BLD: 7.9 E9/L (ref 4.5–11.5)

## 2023-03-11 PROCEDURE — G0378 HOSPITAL OBSERVATION PER HR: HCPCS

## 2023-03-11 PROCEDURE — 80048 BASIC METABOLIC PNL TOTAL CA: CPT

## 2023-03-11 PROCEDURE — 6370000000 HC RX 637 (ALT 250 FOR IP): Performed by: INTERNAL MEDICINE

## 2023-03-11 PROCEDURE — 36415 COLL VENOUS BLD VENIPUNCTURE: CPT

## 2023-03-11 PROCEDURE — 83735 ASSAY OF MAGNESIUM: CPT

## 2023-03-11 PROCEDURE — 85027 COMPLETE CBC AUTOMATED: CPT

## 2023-03-11 PROCEDURE — 2580000003 HC RX 258: Performed by: INTERNAL MEDICINE

## 2023-03-11 PROCEDURE — 6360000002 HC RX W HCPCS: Performed by: INTERNAL MEDICINE

## 2023-03-11 PROCEDURE — 99214 OFFICE O/P EST MOD 30 MIN: CPT | Performed by: STUDENT IN AN ORGANIZED HEALTH CARE EDUCATION/TRAINING PROGRAM

## 2023-03-11 RX ADMIN — SODIUM CHLORIDE, PRESERVATIVE FREE 10 ML: 5 INJECTION INTRAVENOUS at 08:12

## 2023-03-11 RX ADMIN — SODIUM CHLORIDE, PRESERVATIVE FREE 10 ML: 5 INJECTION INTRAVENOUS at 03:25

## 2023-03-11 RX ADMIN — CEFAZOLIN 2000 MG: 2 INJECTION, POWDER, FOR SOLUTION INTRAMUSCULAR; INTRAVENOUS at 03:25

## 2023-03-11 RX ADMIN — ATORVASTATIN CALCIUM 20 MG: 20 TABLET, FILM COATED ORAL at 08:10

## 2023-03-11 RX ADMIN — ASPIRIN 81 MG CHEWABLE TABLET 81 MG: 81 TABLET CHEWABLE at 08:10

## 2023-03-11 RX ADMIN — METOPROLOL SUCCINATE 25 MG: 25 TABLET, EXTENDED RELEASE ORAL at 08:10

## 2023-03-11 RX ADMIN — LOSARTAN POTASSIUM 100 MG: 50 TABLET, FILM COATED ORAL at 08:11

## 2023-03-11 ASSESSMENT — PAIN SCALES - GENERAL
PAINLEVEL_OUTOF10: 0

## 2023-03-11 NOTE — PROGRESS NOTES
Monitor dcd cleaned and placed in slot in nurses station. Discharge instructions and meds reviewed with patient and daughter.

## 2023-03-11 NOTE — PLAN OF CARE
Problem: Discharge Planning  Goal: Discharge to home or other facility with appropriate resources  3/10/2023 2242 by Elinor Shah RN  Outcome: Progressing     Problem: Safety - Adult  Goal: Free from fall injury  3/10/2023 2242 by Elinor Shah RN  Outcome: Progressing     Problem: ABCDS Injury Assessment  Goal: Absence of physical injury  3/10/2023 2242 by Elinor Shah RN  Outcome: Progressing     Problem: Pain  Goal: Verbalizes/displays adequate comfort level or baseline comfort level  Outcome: Progressing

## 2023-03-11 NOTE — DISCHARGE SUMMARY
Hospital Medicine Discharge Summary    Patient ID: Verna Lozada      Patient's PCP: Herman Dao, APRN - [place]    Admit Date: 3/10/2023     Discharge Date:   3/11/2023    Admitting Physician: Korey Connell MD     Discharge Physician: Boby Camilo MD \    Discharge condition stable    Discharge Diagnoses:  CRT-P in situ   Nonischemic cardiomyopathy and chronic HFmrEF  Complete AV block  Chronic bifascicular block  Hypertension  Hyperlipidemia  Obstructive sleep apnea      The patient was seen and examined on day of discharge and this discharge summary is in conjunction with any daily progress note from day of discharge. Hospital Course:     Patient was admitted yesterday for pacemaker upgrade to CRT-P. Tolerated procedure without any complications. Observed overnight. No events. Patient is currently stable. No new complaints will be discharged today to follow-up with EP in 2 weeks. Exam:     BP (!) 174/84   Pulse 76   Temp 97.5 °F (36.4 °C) (Temporal)   Resp 16   Ht 6' 2\" (1.88 m)   Wt 250 lb 6 oz (113.6 kg)   SpO2 92%   BMI 32.15 kg/m²     General appearance: No apparent distress, appears stated age and cooperative. HEENT: Normal cephalic, atraumatic without obvious deformity. Pupils equal, round, and reactive to light. Extra ocular muscles intact. Conjunctivae/corneas clear. Neck: Supple, with full range of motion. No jugular venous distention. Trachea midline. Respiratory: Clear on auscultation  Cardiovascular: RRR, heart sounds 1 and 2 only  Abdomen: Soft nontender. Musculoskeletal: No clubbing, cyanosis, edema of bilateral lower extremities. Brisk capillary refill. Skin: Normal skin color. No rashes or lesions. Neurologic:  Neurovascularly intact without any focal sensory/motor deficits.  Cranial nerves: II-XII intact, grossly non-focal.  Psychiatric: Alert and oriented, thought content appropriate, normal insight       Consults:     Cardiology [EP)    Significant Diagnostic Studies:     Reviewed    Disposition:  \Home    Discharge Instructions/Follow-up:    Follow-up with cardiology [EP] in 2 weeks    Code Status:  Full Code     Activity: activity as tolerated    Diet: cardiac diet    Labs: For convenience and continuity at follow-up the following most recent labs are provided:      CBC:    Lab Results   Component Value Date/Time    WBC 7.9 03/11/2023 05:38 AM    HGB 14.2 03/11/2023 05:38 AM    HCT 41.5 03/11/2023 05:38 AM     03/11/2023 05:38 AM       Renal:    Lab Results   Component Value Date/Time     03/11/2023 05:38 AM    K 3.8 03/11/2023 05:38 AM     03/11/2023 05:38 AM    CO2 23 03/11/2023 05:38 AM    BUN 13 03/11/2023 05:38 AM    CREATININE 1.1 03/11/2023 05:38 AM    CALCIUM 7.8 03/11/2023 05:38 AM       Discharge Medications:     Current Discharge Medication List             Details   losartan (COZAAR) 25 MG tablet Take 12.5 mg by mouth daily      cyanocobalamin 1000 MCG/ML injection INJECT 1ML ONCE A MONTH AS DIRECTED      Azilsartan Medoxomil 80 MG TABS Take 80 mg by mouth daily      fluticasone (FLONASE) 50 MCG/ACT nasal spray 1 spray by Each Nostril route daily  Qty: 1 Bottle, Refills: 5      albuterol sulfate  (90 Base) MCG/ACT inhaler as needed      Probiotic Product (PROBIOTIC-10 PO) Take 1 tablet by mouth daily      atorvastatin (LIPITOR) 20 MG tablet at bedtime  Refills: 2      vitamin D (ERGOCALCIFEROL) 95396 UNITS CAPS capsule Take 5,000 Units by mouth daily      aspirin (ASPIRIN CHILDRENS) 81 MG chewable tablet Take 1 tablet by mouth daily. Qty: 30 tablet, Refills: 3      metoprolol succinate (TOPROL XL) 25 MG extended release tablet Take 25 mg by mouth 2 times daily              Time Spent on discharge is more than 30 minutes in the examination, evaluation, counseling and review of medications and discharge plan. Signed:     Jade Grimm MD   3/11/2023

## 2023-03-11 NOTE — PROGRESS NOTES
[facility] and [facility] Electrophysiology  Inpatient Progress Note  PATIENT: Imer Moreno  MEDICAL RECORD NUMBER: [id number]  DATE OF SERVICE:  3/11/2023  ELECTROPHYSIOLOGIST: Clair Escalona MD   REFERRING PHYSICIAN:  SABINA Antunez - CNP  CHIEF COMPLAINT: Pacemaker in situ    HPI: This is a 80 y.o. male with a history of   Patient Active Problem List   Diagnosis    Coronary artery disease    Sleep apnea    BPH (benign prostatic hyperplasia)    Hyperlipidemia    Hypertension    Generalized abdominal pain    Obstruction of intestine    Cholelithiasis    Small bowel obstruction (HCC)    Bowel obstruction (HCC)    Shortness of breath    Complete AV block (HCC)    AV block, 3rd degree ([address]    Cardiac pacemaker in situ    Mild intermittent asthma without complication    S/P placement of cardiac pacemaker    Nonischemic cardiomyopathy [address] [address]   Initial consult 10/25/21: The patient presents to the hospital complaining of generalized weakness and slow heart rate. The patient has history of coronary artery disease status post PCI of RCA on 7/13/2009, chronic bifascicular block, hypertension, hyperlipidemia, obstructive sleep apnea and obesity. The patient states that he has been diagnosed with chronic bronchitis for the past 3 months. He had been treated with antibiotic in the past and is currently using only inhalers. He reports occasional lightheadedness on and off and He reports today he felt weak and when he checked his pulse using his pulse oximetry, it showed that his HR was 35 bpm so he decided to come to ED. He denies any chest pain, dyspnea, palpitation, syncope, PND or orthopnea. In ED he was noted to be in normal sinus rhythm with complete AV block with escapes in RBBB and LAFB at 35 bpm. He was given IV Glucagon.  He is currently taking ToproL XL mg bid for his CAD and last dose of ToproL XL was 10/24/21 at 10.00 PM. Cardiac electrophysiology service is consulted for complete AV block. 10/26/21: The patient is seen in the hospital for follow up. He denies any chest pain, dyspnea or palpitations. He remians in complete AV block with V paced at 60 bpm with PVCs. There were evidence of lost of capture noted intermittently at 1.00 AM and 7.00 AM suspected from positioning of the patient. Current Threshold was 0.5 mA and the output is setting at 5 mA. 10/27/21: The patient is seen in the hospital for follow-up, he has no cardiac complaints. He underwent placement of a dual chamber pacemaker on 10/26/21 today the Aquacel dressing is clean dry and intact without edema, ecchymosis or erythema, he has no complaints of pain at the device site and the device has normal function. 10/11/22: The patient is seen in cardiac electrophysiology clinic for follow up. The patient currently feels well but does reports SOB. He offers no complaints from a device POV. The device site looks well healed and free from infection or erosion. He reports shortness of breath with exertion. The patient denies any chest pain, palpitations, dizziness, syncope, orthopnea or paroxysmal nocturnal dyspnea. The patient continues to be followed remotely. 3/10/23: The patient is seen in the hospital for CRT-P upgrade. The patient reports HAWKINS. Echo 1/11/23 showed LV EF of 50%. Nuclear stress test on 1/11/23 showed apical inferior scar and LV EF 32%. Cardiac MRI at [facility] on 1/13/23 showed LV EF of 41% and no definite scar noted. Risks, benefits, and alternatives of CRT-P upgrade were discussed in detail today. These risks include but are not limited to bleeding, infection, blood clot, pneumothorax, hemothorax,cardiac valve damage, cardiac perforation and tamponade required emergent thoracotomy, contrast induced nephropathy leading to short or even long term dialysis, vascular injury requiring emergent surgical repair, lead dislodgement, stroke and even death.  The patient understands these risks and agrees to proceed with the procedure. 3/11/23: Patient had upgrade of MDT dc PPM to CRT-P on 3/10/23 by Dr Henry Yan. Denies any complaints at this time. Patient Active Problem List    Diagnosis Date Noted    S/P placement of cardiac pacemaker 03/10/2023     Priority: Medium    Nonischemic cardiomyopathy ([facility] [address] 03/10/2023     Priority: Medium    Mild intermittent asthma without complication     Cardiac pacemaker in situ     AV block, 3rd degree ([address] 10/25/2021    Complete AV block ([address]     Shortness of breath 2021    Small bowel obstruction ([address] 2019    Bowel obstruction ([address] 2019    Cholelithiasis 2016    Obstruction of intestine 2016    Generalized abdominal pain 2016    Coronary artery disease      Overview Note:     Cardiac catheterization (09): Nondominant right coronary artery but with a large posterolateral branch supplying a portion of the inferior wall with 70% stenosis in the mid right coronary artery. Stenting of mid RCA with 3.5 x 12 mm  non drug-eluting stent.        Sleep apnea     BPH (benign prostatic hyperplasia)     Hyperlipidemia     Hypertension        Past Medical History:   Diagnosis Date    Arthritis     Asthma     BPH (benign prostatic hyperplasia)     Bradycardia 10/25/2021    Emergent TVP placed    Coronary artery disease     Diverticulitis of colon     History of blood transfusion     Hyperlipidemia     Hypertension     Intestinal ulcer     PUD (peptic ulcer disease)     Sleep apnea     Possible       Family History   Problem Relation Age of Onset    Heart Attack Father        Social History     Tobacco Use    Smoking status: Former     Packs/day: 1.00     Years: 35.00     Pack years: 35.00     Types: Cigarettes     Start date:      Quit date:      Years since quittin.2    Smokeless tobacco: Never   Substance Use Topics    Alcohol use: Yes     Comment: occasionally       Current Facility-Administered Medications   Medication Dose Route Frequency Provider Last Rate Last Admin    metoprolol succinate (TOPROL XL) extended release tablet 25 mg  25 mg Oral BID Damien Gutierrez MD   25 mg at 03/11/23 0810    aspirin chewable tablet 81 mg  81 mg Oral Daily Damien Gutierrez MD   81 mg at 03/11/23 0810    atorvastatin (LIPITOR) tablet 20 mg  20 mg Oral Daily Damien Gutierrez MD   20 mg at 03/11/23 0810    albuterol (PROVENTIL) nebulizer solution 2.5 mg  2.5 mg Nebulization Q4H PRN Negin Lawton MD        fluticasone (FLONASE) 50 MCG/ACT nasal spray 1 spray  1 spray Each Nostril Daily Negin Lawton MD        sodium chloride flush 0.9 % injection 5-40 mL  5-40 mL IntraVENous 2 times per day Damien Gutierrez MD   10 mL at 03/11/23 0812    sodium chloride flush 0.9 % injection 5-40 mL  5-40 mL IntraVENous PRN Damien Gutierrez MD   10 mL at 03/11/23 0325    0.9 % sodium chloride infusion   IntraVENous PRN Negin Lawton MD        ondansetron (ZOFRAN) injection 4 mg  4 mg IntraVENous Q6H PRN Negin Lawton MD        acetaminophen (TYLENOL) tablet 650 mg  650 mg Oral Q4H PRN Negin Lawton MD   650 mg at 03/10/23 2046    oxyCODONE (ROXICODONE) immediate release tablet 5 mg  5 mg Oral Q4H PRN Damien Gutierrez MD        Or    oxyCODONE (ROXICODONE) immediate release tablet 10 mg  10 mg Oral Q4H PRN Negin Lawton MD        losartan (COZAAR) tablet 100 mg  100 mg Oral Daily Damien Gutierrez MD   100 mg at 03/11/23 0811        No Known Allergies    ROS:   Constitutional: Negative for fever,  activity change and negative for appetite change. HENT: Negative for epistaxis. Eyes: Negative for diploplia, blurred vision. Respiratory: Negative for cough and chest tightness. Positive for shortness of breath and negative for wheezing. Cardiovascular: pertinent positives in HPI  Gastrointestinal: Negative for abdominal pain and blood in stool.    All other review of systems are negative     PHYSICAL EXAM:   Vitals:    03/10/23 2330 03/11/23 0315 03/11/23 0400 03/11/23 0740   BP: 137/78 (!) 146/78  129/75   Pulse: 66 67  67   Resp: 16 16  16   Temp: 97.1 °F (36.2 °C) 97.8 °F (36.6 °C)  97.6 °F (36.4 °C)   TempSrc: Temporal Temporal  Temporal   SpO2:  95%  93%   Weight:   250 lb 6 oz (113.6 kg)    Height:           Constitutional: Well-developed, no acute distress  Eyes: conjunctivae normal, no xanthelasma   Ears, Nose, Throat: oral mucosa moist, no cyanosis   CV: no JVD. Regular rate. No murmurs, rubs, or gallops. PMI is nondisplaced  Lungs: clear to auscultation bilaterally, normal respiratory effort without used of accessory muscles  Abdomen: soft, non-tender, bowel sounds present, no masses or hepatomegaly   Musculoskeletal: no digital clubbing, trace edema of LEs  Skin: warm, no rashes   Device site: well healed. No erosion, infection or migration    I have personally reviewed the laboratory, cardiac diagnostic and radiographic testing as outlined below:    Data:    Recent Labs     03/10/23  0940 03/11/23  0538   WBC 8.7 7.9   HGB 15.6 14.2   HCT 44.1 41.5    181       Recent Labs     03/10/23  0940 03/11/23  0538    140   K 4.1 3.8   * 107   CO2 25 23   BUN 14 13   CREATININE 1.1 1.1   CALCIUM 8.7 7.8*        Lab Results   Component Value Date/Time    MG 2.1 03/11/2023 05:38 AM     No results for input(s): TSH in the last 72 hours. No results for input(s): INR in the last 72 hours. EKG 03/11/23 : Sinus rhythm with A sensed V paced rate 71 bpm  Please see scan in Cardiology. Cardiac MRI 1/13/23:      Echocardiogram 12/14/21:   Findings      Left Ventricle   Septal motion consistent with conduction abnormality . Low normal left ventricular systolic function. Stage I diastolic dysfunction. Ejection fraction is visually estimated at 50%. Right Ventricle   Normal right ventricle structure and function.       Left Atrium   Normal sized left atrium. Right Atrium   Normal right atrium size. Mitral Valve   Structurally normal mitral valve. Physiologic mitral regurgitation. Tricuspid Valve   Trace tricuspid regurgitation. RVSP is 29 mmHg. Aortic Valve   Trileaflet aortic valve   No hemodynamically significant aortic stenosis is present. No evidence of aortic valve regurgitation      Pulmonic Valve   Mild pulmonic regurgitation. Pericardial Effusion   No evidence of pericardial effusion. Aorta   Aortic root dimension within normal limits. Miscellaneous   The inferior vena cava diameter is normal with normal respiratory   variation. Conclusions      Summary   Low normal left ventricular systolic function. Stage I diastolic dysfunction. Physiologic mitral regurgitation. Signature      ----------------------------------------------------------------   Electronically signed by Patti Haynes DO (Interpreting   physician) on 12/15/2022 05:19 PM   ----------------------------------------------------------------     Echocardiogram 6/16/21:       Left Ventricle   Normal left ventricle size and systolic function   Stage I diastolic dysfunction   No wall motion abnormalities   Ejection fraction is visually estimated at 60%. Right Ventricle   Normal right ventricle structure and function. Left Atrium   Normal sized left atrium. Right Atrium   Normal right atrium size. Mitral Valve   Structurally normal mitral valve. No mitral regurgitation. No mitral stenosis. Tricuspid Valve   Trace tricuspid regurgitation. RVSP is 25 mmHg. Aortic Valve   Trileaflet aortic valve   No hemodynamically significant aortic stenosis is present. No evidence of aortic valve regurgitation      Pulmonic Valve   Mild to moderate pulmonic regurgitation. Pericardial Effusion   No evidence of pericardial effusion. Aorta   Aortic root dimension within normal limits.    Miscellaneous   The inferior vena cava diameter is normal with normal respiratory   variation. Conclusions      Summary   Normal left ventricle size and systolic function   Stage I diastolic dysfunction      Signature      ----------------------------------------------------------------   Electronically signed by Bradley Muse DO (Interpreting   physician) on 06/17/2021 05:19 PM   ----------------------------------------------------------------    Cardiac Catheterization 7/13/09:   - Nondominant right coronary artery but with a large posterolateral branch supplying a portion of the inferior wall with 70% stenosis in the mid right coronary artery. - Stenting of mid RCA with 3.5 x 12 mm  non drug-eluting stent. Stress test 1/11/23:  FINDINGS: The overall quality of the study was good. Left ventricular cavity size was noted to be enlarged on both rest and stress studies. Rotational analog analysis demonstrated no patient motion or abnormal extracardiac radioactivity. A severe defect was present in the apical inferior wall(s) that was  large sized by quantification. The resting images show no change. Gated SPECT left ventricular ejection fraction was calculated to be 32%, with akinesis of the apical inferior wall. Impression:    ECG during the infusion did not change. The myocardial perfusion imaging was abnormal. The abnormality was a a large sized fixed defect in the apical inferior wall suggestive of a prior MI. Overall left ventricular systolic function was abnormal with regional wall motion abnormalities. High risk general pharmacologic stress test due to LV dysfunction. Thank you for sending your patient to this [employer]. Electronically signed by Delio Helm MD on 1/11/23 at 1:47 PM EST               Stress Test 3/7/18:   FINDINGS: The overall quality of the study was good.      Left ventricular cavity size was noted to be normal.     Rotational analog analysis demonstrated no patient motion. The gated SPECT stress imaging in the short, vertical long, and   horizontal long axis demonstrated normal homogeneous tracer   distribution throughout the myocardium. A moderate defect was present in the inferior wall that was large   sized by quantification. The resting images show no change. Gated SPECT left ventricular ejection fraction was calculated to   be 58% with normal myocardial thickening and wall motion. Impression:     1. ECG during the infusion did not change. 2. Myocardial perfusion imaging demonstrated a large sized fixed   defect in the inferior wall suggestive of a prior MI   3. Overall left ventricular systolic function was normal without   regional wall motion abnormalities. 4. Low risk general pharmacologic stress test.     Thank you for sending your patient to this [facility]. Device Interrogation 3/11/2023 :  Make/Model "[employer] Percepta  Mode DDDR 60/120  P wave: 3.6 mV  Impedance: 532 ohms   Threshold: 1.25 V @ 0.4 ms  RV R wave: paced mV  Impedance: 361 ohms   Threshold: 0.75 V @ 0.4 ms  LV R wave: N/A Impedance: 665 ohsm Threshold: 1.0 V @ 0.4 msec  Pacing: A: 11.9%  BiV: 99.9%   Battery Voltage/Longevity: 3.11 V   Arrhythmias: none   Reprogramming included none   Overall device function is normal  All device programmable settings were evaluated per above and in the scanned document, along with iterative adjustments (capture thresholds) to assess and select the most appropriate final programming to provide for consistent delivery of the appropriate therapy and to verify function of the device. I have independently reviewed all of the ECGs and rhythm strips per above    Telemetry: Sinus-ventricular paced     Assessment/Plan:  This is a 80 y.o. male with a history of   Patient Active Problem List   Diagnosis    Coronary artery disease    Sleep apnea    BPH (benign prostatic hyperplasia) Hyperlipidemia    Hypertension    Generalized abdominal pain    Obstruction of intestine    Cholelithiasis    Small bowel obstruction (HCC)    Bowel obstruction (HCC)    Shortness of breath    Complete AV block (HCC)    AV block, 3rd degree (HCC)    Cardiac pacemaker in situ    Mild intermittent asthma without complication    S/P placement of cardiac pacemaker    Nonischemic cardiomyopathy ([address]      1. CRT-P in situ   - DOI 3/10/23 upgrade dc PPM (DOI: 10/26/21)  - [employer]; CRT-P  - Normal device functon.  - 99.9% BiV pacing.   - Stable device function. - CXR with leads in appropriate position and no PTX.  - Site stable. 2. Nonischemic cardiomyopathy and chronic HFmrEF  - NYHA class III, ACC/AHA stage C.  - Echo 1/11/23 showed LV EF of 50%. - Nuclear stress test on 1/11/23 showed apical inferior scar and LV EF 32%. - Cardiac MRI at [facility] on 1/13/23 showed LV EF of 41% and no definite scar noted. 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  - Risks, benefits, and alternatives of CRT-P upgrade were discussed in detail today. These risks include but are not limited to bleeding, infection, blood clot, pneumothorax, hemothorax,cardiac valve damage, cardiac perforation and tamponade required emergent thoracotomy, contrast induced nephropathy leading to short or even long term dialysis, vascular injury requiring emergent surgical repair, lead dislodgement, stroke and even death. The patient understands these risks and agrees to proceed with the procedure. 3 Complete AV block  - Underlying RBBB and LAFB since 1/3/2012 per EKG. - Status post pacemaker implantation. 4. Chronic bifascicular block  - Diagnosed with RBBB and LAFB since 1/3/2012. 5. Coronary artery disease   - [facility] 7/13/2009 showed nondominant right coronary artery but with a large posterolateral branch supplying a portion of the inferior wall with 70% stenosis in the mid right coronary artery. - Status post PCI of RCA on 7/13/2009.   - On ASA, Toprol XL and Lipitor. 6. Hypertension  - On Edarbi and Toprol XL. 7. Hyperlipidemia  - On Lipitor    8. Obstructive sleep apnea     9. Obesity  -Body mass index is 32.15 kg/m². 10. Chronic bronchitis   - On Flonase and Albuterol. Recommendations:  1. 2 week device clinic appt  2. EP provider appt per Dr Agus Kinney office. 3. EP service will sign off. Please contact with questions/concerns. Thank you for allowing me to participate in your patient's care. Please call me if there are any questions or concerns.       Joy Croft, DO  Cardiac Electrophysiology  [facility]) Physicians  [facility]: [facility]  10:48 AM  3/11/2023

## 2023-03-12 LAB
EKG ATRIAL RATE: 60 BPM
EKG P AXIS: 120 DEGREES
EKG P-R INTERVAL: 152 MS
EKG Q-T INTERVAL: 512 MS
EKG QRS DURATION: 154 MS
EKG QTC CALCULATION (BAZETT): 512 MS
EKG R AXIS: -87 DEGREES
EKG T AXIS: 75 DEGREES
EKG VENTRICULAR RATE: 60 BPM

## 2023-03-16 ENCOUNTER — TELEPHONE (OUTPATIENT)
Dept: CARDIAC CATH/INVASIVE PROCEDURES | Age: 83
End: 2023-03-16

## 2023-03-24 ENCOUNTER — NURSE ONLY (OUTPATIENT)
Dept: NON INVASIVE DIAGNOSTICS | Age: 83
End: 2023-03-24

## 2023-03-24 DIAGNOSIS — Z95.0 PACEMAKER: Primary | ICD-10-CM

## 2023-03-24 NOTE — PATIENT INSTRUCTIONS
Continue arm restrictions until 04/10/2023  You may shower starting today    Call if any signs or symptoms of infection [phone] ext: [phone]  Fevers, chills, redness, swelling or drainage.        Hook up home  Monitor      [facility] Stay connected: [phone]

## 2023-04-24 ENCOUNTER — OFFICE VISIT (OUTPATIENT)
Dept: CARDIOLOGY CLINIC | Age: 83
End: 2023-04-24
Payer: MEDICARE

## 2023-04-24 VITALS
WEIGHT: 261 LBS | BODY MASS INDEX: 33.5 KG/M2 | HEIGHT: 74 IN | DIASTOLIC BLOOD PRESSURE: 82 MMHG | HEART RATE: 70 BPM | SYSTOLIC BLOOD PRESSURE: 152 MMHG | RESPIRATION RATE: 16 BRPM

## 2023-04-24 DIAGNOSIS — I25.119 CORONARY ARTERY DISEASE WITH ANGINA PECTORIS, UNSPECIFIED VESSEL OR LESION TYPE, UNSPECIFIED WHETHER NATIVE OR TRANSPLANTED HEART (HCC): Primary | ICD-10-CM

## 2023-04-24 DIAGNOSIS — I10 PRIMARY HYPERTENSION: ICD-10-CM

## 2023-04-24 PROCEDURE — 1123F ACP DISCUSS/DSCN MKR DOCD: CPT | Performed by: INTERNAL MEDICINE

## 2023-04-24 PROCEDURE — 99214 OFFICE O/P EST MOD 30 MIN: CPT | Performed by: INTERNAL MEDICINE

## 2023-04-24 PROCEDURE — 3079F DIAST BP 80-89 MM HG: CPT | Performed by: INTERNAL MEDICINE

## 2023-04-24 PROCEDURE — 93000 ELECTROCARDIOGRAM COMPLETE: CPT | Performed by: INTERNAL MEDICINE

## 2023-04-24 PROCEDURE — 3077F SYST BP >= 140 MM HG: CPT | Performed by: INTERNAL MEDICINE

## 2023-04-24 RX ORDER — LOSARTAN POTASSIUM 25 MG/1
25 TABLET ORAL DAILY
Qty: 90 TABLET | Refills: 3 | Status: SHIPPED | OUTPATIENT
Start: 2023-04-24

## 2023-04-24 NOTE — PROGRESS NOTES
Dina Rosas  1940  Date of Service: 2023    Patient Active Problem List    Diagnosis Date Noted    S/P placement of cardiac pacemaker 03/10/2023     Priority: Medium    Nonischemic cardiomyopathy ([facility] [address] 03/10/2023     Priority: Medium    Mild intermittent asthma without complication     Cardiac pacemaker in situ     AV block, 3rd degree ([facility] [address] 10/25/2021    Complete AV block ([facility] [address]     Shortness of breath 2021    Small bowel obstruction ([facility] [address] 2019    Bowel obstruction ([facility] [address] 2019    Cholelithiasis 2016    Obstruction of intestine 2016    Generalized abdominal pain 2016    Coronary artery disease      Overview Note:     Cardiac catheterization (09): Nondominant right coronary artery but with a large posterolateral branch supplying a portion of the inferior wall with 70% stenosis in the mid right coronary artery. Stenting of mid RCA with 3.5 x 12 mm  non drug-eluting stent.        Sleep apnea     BPH (benign prostatic hyperplasia)     Hyperlipidemia     Hypertension        Social History     Socioeconomic History    Marital status:      Spouse name: None    Number of children: None    Years of education: None    Highest education level: None   Tobacco Use    Smoking status: Former     Packs/day: 1.00     Years: 35.00     Pack years: 35.00     Types: Cigarettes     Start date:      Quit date:      Years since quittin.3    Smokeless tobacco: Never   Vaping Use    Vaping Use: Never used   Substance and Sexual Activity    Alcohol use: Yes     Comment: occasionally    Drug use: No       Current Outpatient Medications   Medication Sig Dispense Refill    Azilsartan Medoxomil 80 MG TABS Take 80 mg by mouth daily      fluticasone (FLONASE) 50 MCG/ACT nasal spray 1 spray by Each Nostril route daily 1 Bottle 5    albuterol sulfate  (90 Base) MCG/ACT inhaler as needed      Probiotic Product (PROBIOTIC-10 PO) Take 1 tablet by

## 2023-05-03 ENCOUNTER — HOSPITAL ENCOUNTER (OUTPATIENT)
Age: 83
Discharge: HOME | End: 2023-05-03
Payer: MEDICARE

## 2023-05-03 DIAGNOSIS — I10 PRIMARY HYPERTENSION: ICD-10-CM

## 2023-05-03 LAB
ANION GAP SERPL CALCULATED.3IONS-SCNC: 9 MMOL/L (ref 7–16)
BUN SERPL-MCNC: 15 MG/DL (ref 6–23)
CALCIUM SERPL-MCNC: 8.9 MG/DL (ref 8.6–10.2)
CHLORIDE SERPL-SCNC: 108 MMOL/L (ref 98–107)
CO2 SERPL-SCNC: 24 MMOL/L (ref 22–29)
CREAT SERPL-MCNC: 1.2 MG/DL (ref 0.7–1.2)
GLUCOSE SERPL-MCNC: 108 MG/DL (ref 74–99)
POTASSIUM SERPL-SCNC: 4.6 MMOL/L (ref 3.5–5)
SODIUM SERPL-SCNC: 141 MMOL/L (ref 132–146)

## 2023-05-03 PROCEDURE — 36415 COLL VENOUS BLD VENIPUNCTURE: CPT

## 2023-05-03 PROCEDURE — 80048 BASIC METABOLIC PNL TOTAL CA: CPT

## 2023-05-08 ENCOUNTER — TELEPHONE (OUTPATIENT)
Dept: CARDIOLOGY CLINIC | Age: 83
End: 2023-05-08

## 2023-05-08 ENCOUNTER — NURSE ONLY (OUTPATIENT)
Dept: CARDIOLOGY CLINIC | Age: 83
End: 2023-05-08

## 2023-05-08 RX ORDER — METOPROLOL SUCCINATE 50 MG/1
50 TABLET, EXTENDED RELEASE ORAL 2 TIMES DAILY
COMMUNITY

## 2023-05-08 NOTE — TELEPHONE ENCOUNTER
Patient notified of BP/P results and Dr. Silvina Aaron recommendation. Med list amended. BP/P check scheduled for 5/22/23 at 1:20 p.m.

## 2023-05-08 NOTE — PROGRESS NOTES
Patient was seen in the office today for a BP/P check per Dr. Alvaro Blackburn.     Sitting BP:150/86    Sitting P:81    Standing BP:142/82    Standing P:84    MA MR

## 2023-05-08 NOTE — TELEPHONE ENCOUNTER
Patient was seen in the office today for a BP/P check per Dr. Estefanía Lacey. Sitting BP:150/86     Sitting P:81     Standing BP:142/82     Standing P:84     MA MR    BP readings from home scanned.   Losartan 25 mg daily added on 4/24/23

## 2023-05-22 ENCOUNTER — NURSE ONLY (OUTPATIENT)
Dept: CARDIOLOGY CLINIC | Age: 83
End: 2023-05-22

## 2023-05-22 ENCOUNTER — TELEPHONE (OUTPATIENT)
Dept: CARDIOLOGY CLINIC | Age: 83
End: 2023-05-22

## 2023-05-22 NOTE — PROGRESS NOTES
Patient was in for a blood pressure check per Dr. Juan Aaron.       Sitting BP:140/82   Sitting Pulse: 80        Standing BP: 146/80  Standing Pulse: 82        Ludy Mukherjee MA

## 2023-05-22 NOTE — TELEPHONE ENCOUNTER
Patient was in for a blood pressure check per Dr. Brianna Tolliver. Sitting BP:140/82          Sitting Pulse: 80         Standing BP: 146/80  Standing Pulse: 82         Ludy Caradine MA      Toprol increased to 50 mg BID on 5/8/23.

## 2023-06-21 RX ORDER — METOPROLOL SUCCINATE 25 MG/1
75 TABLET, EXTENDED RELEASE ORAL 2 TIMES DAILY
Qty: 540 TABLET | Refills: 3 | Status: SHIPPED | OUTPATIENT
Start: 2023-06-21

## 2023-06-21 RX ORDER — METOPROLOL SUCCINATE 25 MG/1
75 TABLET, EXTENDED RELEASE ORAL 2 TIMES DAILY
COMMUNITY
End: 2023-06-21 | Stop reason: SDUPTHER

## 2023-06-26 ENCOUNTER — NURSE ONLY (OUTPATIENT)
Dept: NON INVASIVE DIAGNOSTICS | Age: 83
End: 2023-06-26
Payer: MEDICARE

## 2023-06-26 DIAGNOSIS — Z95.0 CARDIAC PACEMAKER IN SITU: Primary | ICD-10-CM

## 2023-06-26 DIAGNOSIS — I51.9 LV DYSFUNCTION: ICD-10-CM

## 2023-06-26 DIAGNOSIS — I44.2 AV BLOCK, 3RD DEGREE (HCC): ICD-10-CM

## 2023-06-26 PROCEDURE — 93281 PM DEVICE PROGR EVAL MULTI: CPT | Performed by: INTERNAL MEDICINE

## 2023-09-08 PROCEDURE — 93294 REM INTERROG EVL PM/LDLS PM: CPT | Performed by: INTERNAL MEDICINE

## 2023-09-08 PROCEDURE — 93296 REM INTERROG EVL PM/IDS: CPT | Performed by: INTERNAL MEDICINE

## 2023-09-17 PROCEDURE — G2066 INTER DEVC REMOTE 30D: HCPCS | Performed by: INTERNAL MEDICINE

## 2023-09-17 PROCEDURE — 93297 REM INTERROG DEV EVAL ICPMS: CPT | Performed by: INTERNAL MEDICINE

## 2023-10-19 ENCOUNTER — OFFICE VISIT (OUTPATIENT)
Dept: CARDIOLOGY CLINIC | Age: 83
End: 2023-10-19
Payer: MEDICARE

## 2023-10-19 VITALS
WEIGHT: 255.4 LBS | SYSTOLIC BLOOD PRESSURE: 138 MMHG | DIASTOLIC BLOOD PRESSURE: 80 MMHG | RESPIRATION RATE: 16 BRPM | HEART RATE: 73 BPM | HEIGHT: 73 IN | BODY MASS INDEX: 33.85 KG/M2

## 2023-10-19 DIAGNOSIS — I10 HYPERTENSION, UNSPECIFIED TYPE: Primary | ICD-10-CM

## 2023-10-19 PROCEDURE — 99214 OFFICE O/P EST MOD 30 MIN: CPT | Performed by: INTERNAL MEDICINE

## 2023-10-19 PROCEDURE — 3075F SYST BP GE 130 - 139MM HG: CPT | Performed by: INTERNAL MEDICINE

## 2023-10-19 PROCEDURE — 3079F DIAST BP 80-89 MM HG: CPT | Performed by: INTERNAL MEDICINE

## 2023-10-19 PROCEDURE — 93000 ELECTROCARDIOGRAM COMPLETE: CPT | Performed by: INTERNAL MEDICINE

## 2023-10-19 PROCEDURE — 1123F ACP DISCUSS/DSCN MKR DOCD: CPT | Performed by: INTERNAL MEDICINE

## 2023-10-19 RX ORDER — METOPROLOL SUCCINATE 100 MG/1
100 TABLET, EXTENDED RELEASE ORAL 2 TIMES DAILY
Qty: 180 TABLET | Refills: 3 | Status: SHIPPED | OUTPATIENT
Start: 2023-10-19

## 2023-10-19 NOTE — PROGRESS NOTES
Vickie Keaton  1940  Date of Service: 10/19/2023    Patient Active Problem List    Diagnosis Date Noted    S/P placement of cardiac pacemaker 03/10/2023     Priority: Medium    Nonischemic cardiomyopathy ([address]) 03/10/2023     Priority: Medium    Mild intermittent asthma without complication     Cardiac pacemaker in situ     AV block, 3rd degree ([address]) 10/25/2021    Complete AV block ([address])     Shortness of breath 2021    Small bowel obstruction ([address]) 2019    Bowel obstruction ([address]) 2019    Cholelithiasis 2016    Obstruction of intestine 2016    Generalized abdominal pain 2016    Coronary artery disease      Overview Note:     Cardiac catheterization (09): Nondominant right coronary artery but with a large posterolateral branch supplying a portion of the inferior wall with 70% stenosis in the mid right coronary artery. Stenting of mid RCA with 3.5 x 12 mm  non drug-eluting stent.        Sleep apnea     BPH (benign prostatic hyperplasia)     Hyperlipidemia     Hypertension        Social History     Socioeconomic History    Marital status:      Spouse name: None    Number of children: None    Years of education: None    Highest education level: None   Tobacco Use    Smoking status: Former     Packs/day: 1.00     Years: 35.00     Additional pack years: 0.00     Total pack years: 35.00     Types: Cigarettes     Start date:      Quit date:      Years since quittin.8    Smokeless tobacco: Never   Vaping Use    Vaping Use: Never used   Substance and Sexual Activity    Alcohol use: Yes     Comment: occasionally    Drug use: No       Current Outpatient Medications   Medication Sig Dispense Refill    metoprolol succinate (TOPROL XL) 25 MG extended release tablet Take 3 tablets by mouth in the morning and at bedtime 540 tablet 3    losartan (COZAAR) 25 MG tablet Take 1 tablet by mouth daily 90 tablet 3    Azilsartan Medoxomil 80 MG TABS

## 2023-12-08 PROCEDURE — 93296 REM INTERROG EVL PM/IDS: CPT | Performed by: INTERNAL MEDICINE

## 2023-12-08 PROCEDURE — 93294 REM INTERROG EVL PM/LDLS PM: CPT | Performed by: INTERNAL MEDICINE

## 2023-12-17 PROCEDURE — G2066 INTER DEVC REMOTE 30D: HCPCS | Performed by: INTERNAL MEDICINE

## 2023-12-17 PROCEDURE — 93297 REM INTERROG DEV EVAL ICPMS: CPT | Performed by: INTERNAL MEDICINE

## 2024-03-11 PROCEDURE — 93294 REM INTERROG EVL PM/LDLS PM: CPT | Performed by: INTERNAL MEDICINE

## 2024-03-11 PROCEDURE — 93296 REM INTERROG EVL PM/IDS: CPT | Performed by: INTERNAL MEDICINE

## 2024-03-17 PROCEDURE — 93297 REM INTERROG DEV EVAL ICPMS: CPT | Performed by: INTERNAL MEDICINE

## 2024-03-17 NOTE — PROGRESS NOTES
[facility] PHYSICIANS- [facility]  Outpatient Progress Note  PATIENT: Mj Mcguire  MEDICAL RECORD NUMBER: [id number]  DATE OF SERVICE:  3/18/2024  ELECTROPHYSIOLOGIST: Negin Lawton MD   REFERRING PHYSICIAN:  Pattie Sanchez APRN - CNP  CHIEF COMPLAINT: Pacemaker in situ    HPI: This is a 83 y.o. male with a history of   Patient Active Problem List   Diagnosis    Coronary artery disease    Sleep apnea    BPH (benign prostatic hyperplasia)    Hyperlipidemia    Hypertension    Generalized abdominal pain    Obstruction of intestine    Cholelithiasis    Small bowel obstruction (HCC)    Bowel obstruction (HCC)    Shortness of breath    Complete AV block (HCC)    AV block, 3rd degree (HCC)    Cardiac pacemaker in situ    Mild intermittent asthma without complication    S/P placement of cardiac pacemaker    Nonischemic cardiomyopathy (HCC)   The patient is seen in the hospital for follow up. The patient has history of coronary artery disease status post PCI of RCA on 7/13/2009, chronic bifascicular block, hypertension, hyperlipidemia, obstructive sleep apnea and obesity. He underwent placement of a dual chamber pacemaker on 10/26/21 due to complete AV block. The patient reported HAWKINS and echocardiogram on 1/11/23 showed LV EF of 50%. Nuclear stress test on 1/11/23 showed apical inferior scar and LV EF 32%. Cardiac MRI at [facility] on 1/13/23 showed LV EF of 41% and no definite scar noted. He underwent CRT-P upgrade on 3/10/23. The patient reports feeling overall well but does report low energy which he relates to deconditioning. He offers no complaints from a device POV. The device site looks well healed and free from infection or erosion.  The patient denies any chest pain, dyspnea, palpitations, dizziness, syncope, orthopnea or paroxysmal nocturnal dyspnea. The patient continues to be followed remotely.    Patient Active Problem List    Diagnosis Date Noted    S/P

## 2024-03-18 ENCOUNTER — OFFICE VISIT (OUTPATIENT)
Dept: NON INVASIVE DIAGNOSTICS | Age: 84
End: 2024-03-18
Payer: MEDICARE

## 2024-03-18 VITALS
HEART RATE: 76 BPM | BODY MASS INDEX: 33.27 KG/M2 | WEIGHT: 251 LBS | RESPIRATION RATE: 16 BRPM | DIASTOLIC BLOOD PRESSURE: 70 MMHG | OXYGEN SATURATION: 98 % | SYSTOLIC BLOOD PRESSURE: 120 MMHG | HEIGHT: 73 IN

## 2024-03-18 DIAGNOSIS — Z95.0 CARDIAC RESYNCHRONIZATION THERAPY PACEMAKER (CRT-P) IN PLACE: ICD-10-CM

## 2024-03-18 DIAGNOSIS — I25.119 CORONARY ARTERY DISEASE WITH ANGINA PECTORIS, UNSPECIFIED VESSEL OR LESION TYPE, UNSPECIFIED WHETHER NATIVE OR TRANSPLANTED HEART (HCC): Primary | ICD-10-CM

## 2024-03-18 PROCEDURE — 3078F DIAST BP <80 MM HG: CPT | Performed by: INTERNAL MEDICINE

## 2024-03-18 PROCEDURE — 3074F SYST BP LT 130 MM HG: CPT | Performed by: INTERNAL MEDICINE

## 2024-03-18 PROCEDURE — 93000 ELECTROCARDIOGRAM COMPLETE: CPT | Performed by: INTERNAL MEDICINE

## 2024-03-18 PROCEDURE — 99215 OFFICE O/P EST HI 40 MIN: CPT | Performed by: INTERNAL MEDICINE

## 2024-03-18 PROCEDURE — 1123F ACP DISCUSS/DSCN MKR DOCD: CPT | Performed by: INTERNAL MEDICINE

## 2024-03-19 PROCEDURE — 93290 INTERROG DEV EVAL ICPMS IP: CPT | Performed by: INTERNAL MEDICINE

## 2024-03-19 PROCEDURE — 93281 PM DEVICE PROGR EVAL MULTI: CPT | Performed by: INTERNAL MEDICINE

## 2024-06-10 RX ORDER — LOSARTAN POTASSIUM 25 MG/1
25 TABLET ORAL DAILY
Qty: 90 TABLET | Refills: 3 | Status: SHIPPED | OUTPATIENT
Start: 2024-06-10

## 2024-09-09 PROCEDURE — 93296 REM INTERROG EVL PM/IDS: CPT | Performed by: INTERNAL MEDICINE

## 2024-09-09 PROCEDURE — 93294 REM INTERROG EVL PM/LDLS PM: CPT | Performed by: INTERNAL MEDICINE

## 2024-12-05 RX ORDER — METOPROLOL SUCCINATE 100 MG/1
TABLET, EXTENDED RELEASE ORAL
Qty: 180 TABLET | Refills: 0 | Status: SHIPPED | OUTPATIENT
Start: 2024-12-05

## 2024-12-15 PROCEDURE — 93297 REM INTERROG DEV EVAL ICPMS: CPT | Performed by: INTERNAL MEDICINE

## 2025-03-05 RX ORDER — METOPROLOL SUCCINATE 100 MG/1
TABLET, EXTENDED RELEASE ORAL
Qty: 180 TABLET | Refills: 0 | Status: SHIPPED | OUTPATIENT
Start: 2025-03-05

## 2025-03-18 ENCOUNTER — OFFICE VISIT (OUTPATIENT)
Dept: NON INVASIVE DIAGNOSTICS | Age: 85
End: 2025-03-18
Payer: MEDICARE

## 2025-03-18 VITALS
HEART RATE: 63 BPM | OXYGEN SATURATION: 96 % | HEIGHT: 72 IN | BODY MASS INDEX: 34.32 KG/M2 | WEIGHT: 253.4 LBS | SYSTOLIC BLOOD PRESSURE: 140 MMHG | TEMPERATURE: 97.8 F | RESPIRATION RATE: 16 BRPM | DIASTOLIC BLOOD PRESSURE: 78 MMHG

## 2025-03-18 DIAGNOSIS — Z95.0 CARDIAC RESYNCHRONIZATION THERAPY PACEMAKER (CRT-P) IN PLACE: ICD-10-CM

## 2025-03-18 DIAGNOSIS — Z95.0 S/P PLACEMENT OF CARDIAC PACEMAKER: ICD-10-CM

## 2025-03-18 DIAGNOSIS — I49.9 IRREGULAR HEART BEAT: Primary | ICD-10-CM

## 2025-03-18 PROCEDURE — 3078F DIAST BP <80 MM HG: CPT | Performed by: NURSE PRACTITIONER

## 2025-03-18 PROCEDURE — 99214 OFFICE O/P EST MOD 30 MIN: CPT | Performed by: NURSE PRACTITIONER

## 2025-03-18 PROCEDURE — 1159F MED LIST DOCD IN RCRD: CPT | Performed by: NURSE PRACTITIONER

## 2025-03-18 PROCEDURE — 1123F ACP DISCUSS/DSCN MKR DOCD: CPT | Performed by: NURSE PRACTITIONER

## 2025-03-18 PROCEDURE — 3077F SYST BP >= 140 MM HG: CPT | Performed by: NURSE PRACTITIONER

## 2025-03-18 RX ORDER — METOPROLOL SUCCINATE 100 MG/1
100 TABLET, EXTENDED RELEASE ORAL 2 TIMES DAILY
Qty: 180 TABLET | Refills: 3 | Status: SHIPPED | OUTPATIENT
Start: 2025-03-18

## 2025-03-18 RX ORDER — AZILSARTAN KAMEDOXOMIL 80 MG/1
TABLET ORAL DAILY
COMMUNITY

## 2025-03-18 NOTE — PROGRESS NOTES
5. Coronary artery disease   - [facility] 7/13/2009 showed nondominant right coronary artery but with a large posterolateral branch supplying a portion of the inferior wall with 70% stenosis in the mid right coronary artery.   - Status post PCI of RCA on 7/13/2009.  - On ASA, Toprol XL and Lipitor.     6. Hypertension  - Well controlled.  - On Toprol XL and Cozaar.     7. Hyperlipidemia  - On Lipitor     8. Obstructive sleep apnea      9. Obesity  Body mass index is 34.37 kg/m².     10. Chronic bronchitis   - On Albuterol.     Recommendations:  1. Normal CRT-P function.  2. No changes in medications.  3. Remote  pacemaker interrogation every 3 months,   2. Follow up in 1 year or sooner PRN. Encouraged the patient to call the office for any questions or concerns.        Re-education on importance of well controlled HTN (goal BP < 130/80), adequate weight control (goal BMI of < 27), physical activity consisting of moderate cardiopulmonary exercise up to a goal of 250 min/wk, smoking/tobacco abstinence and limited ETOH intake.       I have spent a total of 35 minutes with the patient and the family reviewing the above stated recommendations. And a total of >50% of that time involved face-to-face time providing counseling and or coordination of care with the other providers.      Thank you for allowing me to participate in your patient's care.  Please call me if there are any questions or concerns.        Felisha Walters, APRN - CNP  Cardiac Electrophysiology  [facility]  [facility] [place]: [place] Elect[facility]  11:16 AM  3/18/2025

## 2025-06-02 RX ORDER — LOSARTAN POTASSIUM 25 MG/1
25 TABLET ORAL DAILY
Qty: 90 TABLET | Refills: 3 | Status: SHIPPED | OUTPATIENT
Start: 2025-06-02

## 2025-06-10 PROCEDURE — 93296 REM INTERROG EVL PM/IDS: CPT | Performed by: INTERNAL MEDICINE

## 2025-06-10 PROCEDURE — 93294 REM INTERROG EVL PM/LDLS PM: CPT | Performed by: INTERNAL MEDICINE

## 2025-06-15 PROCEDURE — 93297 REM INTERROG DEV EVAL ICPMS: CPT | Performed by: INTERNAL MEDICINE
